# Patient Record
Sex: FEMALE | Race: WHITE | NOT HISPANIC OR LATINO | Employment: OTHER | URBAN - METROPOLITAN AREA
[De-identification: names, ages, dates, MRNs, and addresses within clinical notes are randomized per-mention and may not be internally consistent; named-entity substitution may affect disease eponyms.]

---

## 2018-03-05 ENCOUNTER — HOSPITAL ENCOUNTER (OUTPATIENT)
Dept: RADIOLOGY | Facility: HOSPITAL | Age: 53
Discharge: HOME | End: 2018-03-05
Attending: OBSTETRICS & GYNECOLOGY
Payer: COMMERCIAL

## 2018-03-05 DIAGNOSIS — Z12.39 SCREENING BREAST EXAMINATION: Primary | ICD-10-CM

## 2018-03-05 DIAGNOSIS — Z12.39 SCREENING BREAST EXAMINATION: ICD-10-CM

## 2018-03-05 PROCEDURE — 77067 SCR MAMMO BI INCL CAD: CPT | Mod: TC

## 2019-03-07 ENCOUNTER — OFFICE VISIT (OUTPATIENT)
Dept: ORTHOPEDICS | Facility: CLINIC | Age: 54
End: 2019-03-07
Payer: COMMERCIAL

## 2019-03-07 VITALS
HEIGHT: 62 IN | BODY MASS INDEX: 26.31 KG/M2 | WEIGHT: 143 LBS | SYSTOLIC BLOOD PRESSURE: 118 MMHG | HEART RATE: 85 BPM | DIASTOLIC BLOOD PRESSURE: 80 MMHG

## 2019-03-07 DIAGNOSIS — M75.42 SUBACROMIAL IMPINGEMENT OF LEFT SHOULDER: Primary | ICD-10-CM

## 2019-03-07 PROCEDURE — 3008F PR BODY MASS INDEX (BMI) DOCUMENTED: ICD-10-PCS | Mod: ,,, | Performed by: ORTHOPAEDIC SURGERY

## 2019-03-07 PROCEDURE — 99212 PR OFFICE/OUTPT VISIT, EST, LEVL II, 10-19 MIN: ICD-10-PCS | Mod: ,,, | Performed by: ORTHOPAEDIC SURGERY

## 2019-03-07 PROCEDURE — 3008F BODY MASS INDEX DOCD: CPT | Mod: ,,, | Performed by: ORTHOPAEDIC SURGERY

## 2019-03-07 PROCEDURE — 73030 PR  X-RAY SHOULDER 2+ VW: ICD-10-PCS | Mod: LT,,, | Performed by: ORTHOPAEDIC SURGERY

## 2019-03-07 PROCEDURE — 73030 X-RAY EXAM OF SHOULDER: CPT | Mod: LT,,, | Performed by: ORTHOPAEDIC SURGERY

## 2019-03-07 PROCEDURE — 99212 OFFICE O/P EST SF 10 MIN: CPT | Mod: ,,, | Performed by: ORTHOPAEDIC SURGERY

## 2019-03-07 RX ORDER — ESTRADIOL 0.5 MG/1
TABLET ORAL
Refills: 1 | COMMUNITY
Start: 2019-01-17 | End: 2021-10-29

## 2019-03-07 RX ORDER — METOPROLOL TARTRATE 25 MG/1
TABLET, FILM COATED ORAL
Refills: 6 | COMMUNITY
Start: 2018-12-09 | End: 2021-10-29 | Stop reason: SDUPTHER

## 2019-03-07 RX ORDER — TOLTERODINE 4 MG/1
CAPSULE, EXTENDED RELEASE ORAL
Refills: 3 | COMMUNITY
Start: 2019-02-20 | End: 2020-07-01

## 2019-03-07 RX ORDER — NITROFURANTOIN MACROCRYSTALS 50 MG/1
CAPSULE ORAL
Refills: 2 | COMMUNITY
Start: 2019-02-20 | End: 2020-07-01

## 2019-03-07 NOTE — PROGRESS NOTES
[facility] ORTHOPEDICS    Subjective:     Chief Complaint:   Chief Complaint   Patient presents with    Left Shoulder - Pain     Left shoulder pain x 1 year       History reviewed. No pertinent past medical history.    Past Surgical History:   Procedure Laterality Date    c sections         Current Outpatient Medications   Medication Sig    estradiol (ESTRACE) 0.5 MG tablet TK ONE T UNDER THE TONGUE QHS    levomefolate calcium (DEPLIN ORAL) Take by mouth.    metoprolol tartrate (LOPRESSOR) 25 MG tablet TK 1 T PO BID WF    nitrofurantoin (MACRODANTIN) 50 MG capsule TK 1 C PO QID    tolterodine (DETROL LA) 4 MG 24 hr capsule TK 1 C PO QD     No current facility-administered medications for this visit.        Review of patient's allergies indicates:   Allergen Reactions    Penicillins        Family History   Problem Relation Age of Onset    Breast cancer Paternal Aunt        Social History     Socioeconomic History    Marital status:      Spouse name: Not on file    Number of children: Not on file    Years of education: Not on file    Highest education level: Not on file   Social Needs    Financial resource strain: Not on file    Food insecurity - worry: Not on file    Food insecurity - inability: Not on file    Transportation needs - medical: Not on file    Transportation needs - non-medical: Not on file   Occupational History    Not on file   Tobacco Use    Smoking status: Former Smoker    Smokeless tobacco: Former User   Substance and Sexual Activity    Alcohol use: Not on file    Drug use: Not on file    Sexual activity: Not on file   Other Topics Concern    Not on file   Social History Narrative    Not on file       History of present illness: Patient returns for the left shoulder. She is much better than when I saw her last time. She sleeping at night. She is doing physical therapy.       Review of Systems:    Constitution: Negative for chills, fever, and sweats.  Negative for  unexplained weight loss.    HENT:  Negative for headaches and blurry vision.    Cardiovascular:Negative for chest pain or irregular heart beat. Negative for hypertension.    Respiratory:  Negative for cough and shortness of breath.    Gastrointestinal: Negative for abdominal pain, heartburn, melena, nausea, and vomitting.    Genitourinary:  Negative bladder incontinence and dysuria.    Musculoskeletal:  See HPI for details.     Neurological: Negative for numbness.    Psychiatric/Behavioral: Negative for depression.  The patient is not nervous/anxious.      Endocrine: Negative for polyuria    Hematologic/Lymphatic: Negative for bleeding problem.  Does not bruise/bleed easily.    Skin: Negative for poor would healing and rash    Objective:      Physical Examination:    Vital Signs:    Vitals:    03/07/19 0941   BP: 118/80   Pulse: 85       Body mass index is 26.16 kg/m².    This a well-developed, well nourished patient in no acute distress.  They are alert and oriented and cooperative to examination.        Patient has full range of motion of the left shoulder. Positive impingement. Negative crossover. Her rotator cuff is grossly intact.  Pertinent New Results:    XRAY Report / Interpretation: AP and lateral of the left shoulder demonstrates a type I acromion. No fracture subluxations dislocations or osteoporotic changes      Assessment/Plan:      Impingement syndrome left shoulder improving. Continue strengthening. Follow-up in 6 weeks if she symptomatic      This note was created using Dragon voice recognition software that occasionally misinterpreted phrases or words.

## 2019-03-18 DIAGNOSIS — Z12.39 SCREENING BREAST EXAMINATION: Primary | ICD-10-CM

## 2019-03-19 ENCOUNTER — HOSPITAL ENCOUNTER (OUTPATIENT)
Dept: RADIOLOGY | Facility: HOSPITAL | Age: 54
Discharge: HOME | End: 2019-03-19
Attending: OBSTETRICS & GYNECOLOGY
Payer: COMMERCIAL

## 2019-03-19 DIAGNOSIS — Z12.39 SCREENING BREAST EXAMINATION: ICD-10-CM

## 2019-03-19 DIAGNOSIS — Z12.39 SCREENING BREAST EXAMINATION: Primary | ICD-10-CM

## 2019-03-19 PROCEDURE — 77063 BREAST TOMOSYNTHESIS BI: CPT | Mod: 26,,, | Performed by: RADIOLOGY

## 2019-03-19 PROCEDURE — 77067 SCR MAMMO BI INCL CAD: CPT | Mod: 26,,, | Performed by: RADIOLOGY

## 2019-03-19 PROCEDURE — 77067 SCR MAMMO BI INCL CAD: CPT | Mod: TC

## 2019-03-19 PROCEDURE — 77067 MAMMO DIGITAL SCREENING BILAT WITH TOMOSYNTHESIS_CAD: ICD-10-PCS | Mod: 26,,, | Performed by: RADIOLOGY

## 2019-03-19 PROCEDURE — 77063 MAMMO DIGITAL SCREENING BILAT WITH TOMOSYNTHESIS_CAD: ICD-10-PCS | Mod: 26,,, | Performed by: RADIOLOGY

## 2019-10-14 DIAGNOSIS — R92.1 BREAST CALCIFICATIONS: Primary | ICD-10-CM

## 2019-10-17 ENCOUNTER — HOSPITAL ENCOUNTER (OUTPATIENT)
Dept: RADIOLOGY | Facility: HOSPITAL | Age: 54
Discharge: HOME | End: 2019-10-17
Attending: OBSTETRICS & GYNECOLOGY
Payer: COMMERCIAL

## 2019-10-17 DIAGNOSIS — R92.1 BREAST CALCIFICATIONS: ICD-10-CM

## 2019-10-17 PROCEDURE — 77065 DX MAMMO INCL CAD UNI: CPT | Mod: TC

## 2019-10-17 PROCEDURE — 77065 MAMMO DIGITAL DIAGNOSTIC RIGHT WITH TOMOSYNTHESIS_CAD: ICD-10-PCS | Mod: 26,,, | Performed by: RADIOLOGY

## 2019-10-17 PROCEDURE — 77065 DX MAMMO INCL CAD UNI: CPT | Mod: 26,,, | Performed by: RADIOLOGY

## 2019-10-17 PROCEDURE — 77061 BREAST TOMOSYNTHESIS UNI: CPT | Mod: TC

## 2019-10-17 PROCEDURE — 77061 MAMMO DIGITAL DIAGNOSTIC RIGHT WITH TOMOSYNTHESIS_CAD: ICD-10-PCS | Mod: 26,,, | Performed by: RADIOLOGY

## 2019-10-17 PROCEDURE — 77061 BREAST TOMOSYNTHESIS UNI: CPT | Mod: 26,,, | Performed by: RADIOLOGY

## 2020-07-01 ENCOUNTER — IMMUNIZATION (OUTPATIENT)
Dept: PHARMACY | Facility: CLINIC | Age: 55
End: 2020-07-01
Payer: COMMERCIAL

## 2020-07-01 ENCOUNTER — CLINICAL SUPPORT (OUTPATIENT)
Dept: INTERNAL MEDICINE | Facility: CLINIC | Age: 55
End: 2020-07-01
Payer: COMMERCIAL

## 2020-07-01 ENCOUNTER — OFFICE VISIT (OUTPATIENT)
Dept: FAMILY MEDICINE | Facility: CLINIC | Age: 55
End: 2020-07-01
Payer: COMMERCIAL

## 2020-07-01 VITALS
BODY MASS INDEX: 27.92 KG/M2 | HEIGHT: 62 IN | OXYGEN SATURATION: 98 % | HEART RATE: 61 BPM | WEIGHT: 151.69 LBS | TEMPERATURE: 98 F | SYSTOLIC BLOOD PRESSURE: 122 MMHG | DIASTOLIC BLOOD PRESSURE: 80 MMHG

## 2020-07-01 DIAGNOSIS — Z00.00 WELLNESS EXAMINATION: Primary | ICD-10-CM

## 2020-07-01 DIAGNOSIS — Z00.00 ANNUAL PHYSICAL EXAM: Primary | ICD-10-CM

## 2020-07-01 LAB
25(OH)D3+25(OH)D2 SERPL-MCNC: 50 NG/ML (ref 30–96)
ALBUMIN SERPL BCP-MCNC: 4 G/DL (ref 3.5–5.2)
ALP SERPL-CCNC: 43 U/L (ref 55–135)
ALT SERPL W/O P-5'-P-CCNC: 26 U/L (ref 10–44)
ANION GAP SERPL CALC-SCNC: 7 MMOL/L (ref 8–16)
AST SERPL-CCNC: 25 U/L (ref 10–40)
BILIRUB SERPL-MCNC: 0.4 MG/DL (ref 0.1–1)
BUN SERPL-MCNC: 11 MG/DL (ref 6–20)
CALCIUM SERPL-MCNC: 9.5 MG/DL (ref 8.7–10.5)
CHLORIDE SERPL-SCNC: 102 MMOL/L (ref 95–110)
CHOLEST SERPL-MCNC: 220 MG/DL (ref 120–199)
CHOLEST/HDLC SERPL: 2.3 {RATIO} (ref 2–5)
CO2 SERPL-SCNC: 25 MMOL/L (ref 23–29)
CREAT SERPL-MCNC: 0.7 MG/DL (ref 0.5–1.4)
ERYTHROCYTE [DISTWIDTH] IN BLOOD BY AUTOMATED COUNT: 12.1 % (ref 11.5–14.5)
EST. GFR  (AFRICAN AMERICAN): >60 ML/MIN/1.73 M^2
EST. GFR  (NON AFRICAN AMERICAN): >60 ML/MIN/1.73 M^2
ESTIMATED AVG GLUCOSE: 94 MG/DL (ref 68–131)
GLUCOSE SERPL-MCNC: 93 MG/DL (ref 70–110)
HBA1C MFR BLD HPLC: 4.9 % (ref 4–5.6)
HCT VFR BLD AUTO: 37.7 % (ref 37–48.5)
HDLC SERPL-MCNC: 95 MG/DL (ref 40–75)
HDLC SERPL: 43.2 % (ref 20–50)
HGB BLD-MCNC: 12.4 G/DL (ref 12–16)
LDLC SERPL CALC-MCNC: 90.2 MG/DL (ref 63–159)
MCH RBC QN AUTO: 32 PG (ref 27–31)
MCHC RBC AUTO-ENTMCNC: 32.9 G/DL (ref 32–36)
MCV RBC AUTO: 97 FL (ref 82–98)
NONHDLC SERPL-MCNC: 125 MG/DL
PLATELET # BLD AUTO: 305 K/UL (ref 150–350)
PMV BLD AUTO: 9.9 FL (ref 9.2–12.9)
POTASSIUM SERPL-SCNC: 3.8 MMOL/L (ref 3.5–5.1)
PROT SERPL-MCNC: 7 G/DL (ref 6–8.4)
RBC # BLD AUTO: 3.87 M/UL (ref 4–5.4)
SARS-COV-2 IGG SERPLBLD QL IA.RAPID: NEGATIVE
SODIUM SERPL-SCNC: 134 MMOL/L (ref 136–145)
TRIGL SERPL-MCNC: 174 MG/DL (ref 30–150)
TSH SERPL DL<=0.005 MIU/L-ACNC: 3.06 UIU/ML (ref 0.4–4)
WBC # BLD AUTO: 6 K/UL (ref 3.9–12.7)

## 2020-07-01 PROCEDURE — 80061 LIPID PANEL: CPT

## 2020-07-01 PROCEDURE — 99386 PR PREVENTIVE VISIT,NEW,40-64: ICD-10-PCS | Mod: S$GLB,,, | Performed by: FAMILY MEDICINE

## 2020-07-01 PROCEDURE — 99999 PR PBB SHADOW E&M-EST. PATIENT-LVL III: CPT | Mod: PBBFAC,,, | Performed by: FAMILY MEDICINE

## 2020-07-01 PROCEDURE — 80053 COMPREHEN METABOLIC PANEL: CPT | Mod: PO

## 2020-07-01 PROCEDURE — 99386 PREV VISIT NEW AGE 40-64: CPT | Mod: S$GLB,,, | Performed by: FAMILY MEDICINE

## 2020-07-01 PROCEDURE — 84443 ASSAY THYROID STIM HORMONE: CPT

## 2020-07-01 PROCEDURE — 82306 VITAMIN D 25 HYDROXY: CPT

## 2020-07-01 PROCEDURE — 83036 HEMOGLOBIN GLYCOSYLATED A1C: CPT

## 2020-07-01 PROCEDURE — 85027 COMPLETE CBC AUTOMATED: CPT | Mod: PO

## 2020-07-01 PROCEDURE — 86769 SARS-COV-2 COVID-19 ANTIBODY: CPT

## 2020-07-01 PROCEDURE — 99999 PR PBB SHADOW E&M-EST. PATIENT-LVL III: ICD-10-PCS | Mod: PBBFAC,,, | Performed by: FAMILY MEDICINE

## 2020-07-01 PROCEDURE — 86703 HIV-1/HIV-2 1 RESULT ANTBDY: CPT

## 2020-07-01 PROCEDURE — 86803 HEPATITIS C AB TEST: CPT

## 2020-07-01 RX ORDER — TOBRAMYCIN AND DEXAMETHASONE 3; 1 MG/ML; MG/ML
SUSPENSION/ DROPS OPHTHALMIC
COMMUNITY
Start: 2020-06-22 | End: 2021-10-29

## 2020-07-01 RX ORDER — ALPRAZOLAM 0.5 MG/1
TABLET ORAL
COMMUNITY
Start: 2020-06-19 | End: 2022-11-29 | Stop reason: DRUGHIGH

## 2020-07-01 NOTE — PROGRESS NOTES
2020                                                                                                                                                                                                                                                                                      Kirsten Doshi  [address] [place] [address]                                                                                                                                                                                                                                                                                                RE: Kirsten Doshi                                                        Clinic #:[id number]                                                                                                                                   Dear  Kirsten Doshi,                                                                                                                                           Thank you for allowing me to serve you and perform your Executive Health exam on 2020.   This letter will serve a brief summary of the history, physical findings, and laboratory/studies performed and recommendations at that time.                                                                                         REASON FOR VISIT: Executive Health Preventive Physical Examination    Past Medical History:   Diagnosis Date    Colon polyps     Endometriosis     PVC (premature ventricular contraction)        Past Surgical History:   Procedure Laterality Date    c sections       SECTION         Family History   Problem Relation Age of Onset    Breast cancer Paternal Aunt        Social History     Socioeconomic History    Marital status:      Spouse name: Not on file    Number of children: 2    Years of education: Not on file    Highest education level: Not on file   Occupational History     Occupation: radiologist     Employer: [employer]   Social Needs    Financial resource strain: Not on file    Food insecurity     Worry: Not on file     Inability: Not on file    Transportation needs     Medical: Not on file     Non-medical: Not on file   Tobacco Use    Smoking status: Former Smoker    Smokeless tobacco: Former User   Substance and Sexual Activity    Alcohol use: Yes    Drug use: Never    Sexual activity: Yes   Lifestyle    Physical activity     Days per week: Not on file     Minutes per session: Not on file    Stress: To some extent   Relationships    Social connections     Talks on phone: Not on file     Gets together: Not on file     Attends Hoahaoism service: Not on file     Active member of club or organization: Not on file     Attends meetings of clubs or organizations: Not on file     Relationship status: Not on file   Other Topics Concern    Not on file   Social History Narrative    Not on file       Allergies: Penicillins    Current Outpatient Medications   Medication Sig Dispense Refill    estradiol (ESTRACE) 0.5 MG tablet TK ONE T UNDER THE TONGUE QHS  1    levomefolate calcium (DEPLIN ORAL) Take by mouth.      metoprolol tartrate (LOPRESSOR) 25 MG tablet TK 1 T PO BID WF  6    ALPRAZolam (XANAX) 0.5 MG tablet       nitrofurantoin (MACRODANTIN) 50 MG capsule TK 1 C PO QID  2    tobramycin-dexamethasone 0.3-0.1% (TOBRADEX) 0.3-0.1 % DrpS       tolterodine (DETROL LA) 4 MG 24 hr capsule TK 1 C PO QD  3     No current facility-administered medications for this visit.        REVIEW OF SYSTEMS:  No recent changes in weight, or complaints of fatigue. No recent changes in vision, or hearing. Denies frequent headaches.No recent changes in voice. No new or changing skin lesions. Denies abnormal bruising or bleeding.  Denies chest pain or sensation of skipped beats. No new onset of shortness of breath, or dyspnea on exertion. Denies abdominal discomfort, constipation,  "diarrhea,or blood in stool. Denies difficulty with urination.   No recent joint swelling or muscle discomfort. Denies pain or weakness in extremeties. No recent loss of balance. Denies problems with sleep or depression.        Remainder of the review of systems without pertinent positves at this time.                                                                              PHYSICAL EXAM:   VITAL SIGNS: /80 (BP Location: Left arm, Patient Position: Sitting, BP Method: Large (Manual))   Pulse 61   Temp 98.3 °F (36.8 °C) (Oral)   Ht 5' 2" (1.575 m)   Wt 68.8 kg (151 lb 10.8 oz)   LMP 06/22/2020 (Approximate)   SpO2 98%   BMI 27.74 kg/m²   GENERAL APPEARANCE:  Well nourished and normally developed,  pleasant 54 y.o. female, in good spirits.  SKIN: Without rashes or overt lesions.  HEENT: Head normacephalic. There was no scleral icterus. Mucous membranes were moist. Dentition. Neck is supple, no thyromegally, or carotid bruits.  LUNGS: Clear to auscultation bilaterally. Normal respiratory effort.  HEART: Exam reveals regular rate and rhythm. First and second heart sounds normal. No murmurs, rubs or gallops.   ABDOMEN: Soft, non-tender, non-distended. Exam reveals normal bowl sounds, no masses, no organomegaly and no aortic enlargement.    EXTREMITIES:  Nonedematous, both femoral and pedal pulses are normal. No joint stiffness or tenderness. Full range of motion and strength, upper and lower bilaterally.      ASSESSMENT/RECOMMENDATIONS :  Wellness exam    At this time,  you appear to be in good medical condition.    Continue to work on regular exercise, maintenance of a healthy weight, balanced diet rich in fruits/vegetables and lean protein, and avoidance of unhealthy habits like smoking and excessive alcohol intake.  I look forward to seeing you again next year.    Please contact me should you have any questions or concerns regarding physical findings, or my recommendations.       Sincerely yours,    MMargy " Fina Hutchison M.D.  Department of Family Practice  [facility]

## 2020-07-02 LAB
HCV AB SERPL QL IA: NEGATIVE
HIV 1+2 AB+HIV1 P24 AG SERPL QL IA: NEGATIVE

## 2020-09-24 ENCOUNTER — IMMUNIZATION (OUTPATIENT)
Dept: PHARMACY | Facility: CLINIC | Age: 55
End: 2020-09-24
Payer: COMMERCIAL

## 2020-10-13 ENCOUNTER — HOSPITAL ENCOUNTER (OUTPATIENT)
Dept: RADIOLOGY | Facility: HOSPITAL | Age: 55
Discharge: HOME | End: 2020-10-13
Attending: OBSTETRICS & GYNECOLOGY
Payer: COMMERCIAL

## 2020-10-13 DIAGNOSIS — R92.1 BREAST CALCIFICATION SEEN ON MAMMOGRAM: ICD-10-CM

## 2020-10-13 DIAGNOSIS — N64.89 GALACTOCELE: ICD-10-CM

## 2020-10-13 PROCEDURE — 76642 US BREAST BILATERAL LIMITED: ICD-10-PCS | Mod: 26,,, | Performed by: RADIOLOGY

## 2020-10-13 PROCEDURE — 77066 MAMMO DIGITAL DIAGNOSTIC BILAT WITH TOMO: ICD-10-PCS | Mod: 26,,, | Performed by: RADIOLOGY

## 2020-10-13 PROCEDURE — 77066 DX MAMMO INCL CAD BI: CPT | Mod: 26,,, | Performed by: RADIOLOGY

## 2020-10-13 PROCEDURE — 77062 BREAST TOMOSYNTHESIS BI: CPT | Mod: 26,,, | Performed by: RADIOLOGY

## 2020-10-13 PROCEDURE — 77066 DX MAMMO INCL CAD BI: CPT | Mod: TC

## 2020-10-13 PROCEDURE — 76642 ULTRASOUND BREAST LIMITED: CPT | Mod: 26,,, | Performed by: RADIOLOGY

## 2020-10-13 PROCEDURE — 77062 MAMMO DIGITAL DIAGNOSTIC BILAT WITH TOMO: ICD-10-PCS | Mod: 26,,, | Performed by: RADIOLOGY

## 2020-10-13 PROCEDURE — 76642 ULTRASOUND BREAST LIMITED: CPT | Mod: TC,50

## 2020-10-23 ENCOUNTER — IMMUNIZATION (OUTPATIENT)
Dept: PHARMACY | Facility: CLINIC | Age: 55
End: 2020-10-23
Payer: COMMERCIAL

## 2020-12-17 ENCOUNTER — IMMUNIZATION (OUTPATIENT)
Dept: PRIMARY CARE CLINIC | Facility: CLINIC | Age: 55
End: 2020-12-17
Payer: COMMERCIAL

## 2020-12-17 DIAGNOSIS — Z23 NEED FOR VACCINATION: ICD-10-CM

## 2020-12-17 PROCEDURE — 0001A COVID-19, MRNA, LNP-S, PF, 30 MCG/0.3 ML DOSE VACCINE: ICD-10-PCS | Mod: CV19,S$GLB,, | Performed by: FAMILY MEDICINE

## 2020-12-17 PROCEDURE — 91300 COVID-19, MRNA, LNP-S, PF, 30 MCG/0.3 ML DOSE VACCINE: CPT | Mod: S$GLB,,, | Performed by: FAMILY MEDICINE

## 2020-12-17 PROCEDURE — 91300 COVID-19, MRNA, LNP-S, PF, 30 MCG/0.3 ML DOSE VACCINE: ICD-10-PCS | Mod: S$GLB,,, | Performed by: FAMILY MEDICINE

## 2020-12-17 PROCEDURE — 0001A COVID-19, MRNA, LNP-S, PF, 30 MCG/0.3 ML DOSE VACCINE: CPT | Mod: CV19,S$GLB,, | Performed by: FAMILY MEDICINE

## 2021-01-07 ENCOUNTER — IMMUNIZATION (OUTPATIENT)
Dept: PRIMARY CARE CLINIC | Facility: CLINIC | Age: 56
End: 2021-01-07
Payer: COMMERCIAL

## 2021-01-07 DIAGNOSIS — Z23 NEED FOR VACCINATION: ICD-10-CM

## 2021-01-07 PROCEDURE — 0002A COVID-19, MRNA, LNP-S, PF, 30 MCG/0.3 ML DOSE VACCINE: ICD-10-PCS | Mod: S$GLB,,, | Performed by: FAMILY MEDICINE

## 2021-01-07 PROCEDURE — 0002A COVID-19, MRNA, LNP-S, PF, 30 MCG/0.3 ML DOSE VACCINE: CPT | Mod: S$GLB,,, | Performed by: FAMILY MEDICINE

## 2021-01-07 PROCEDURE — 91300 COVID-19, MRNA, LNP-S, PF, 30 MCG/0.3 ML DOSE VACCINE: CPT | Mod: S$GLB,,, | Performed by: FAMILY MEDICINE

## 2021-01-07 PROCEDURE — 91300 COVID-19, MRNA, LNP-S, PF, 30 MCG/0.3 ML DOSE VACCINE: ICD-10-PCS | Mod: S$GLB,,, | Performed by: FAMILY MEDICINE

## 2021-04-27 ENCOUNTER — HOSPITAL ENCOUNTER (OUTPATIENT)
Dept: RADIOLOGY | Facility: HOSPITAL | Age: 56
Discharge: HOME | End: 2021-04-27
Attending: OBSTETRICS & GYNECOLOGY
Payer: COMMERCIAL

## 2021-04-27 DIAGNOSIS — R92.1 BREAST CALCIFICATION SEEN ON MAMMOGRAM: ICD-10-CM

## 2021-04-27 PROCEDURE — 77062 BREAST TOMOSYNTHESIS BI: CPT | Mod: 26,,, | Performed by: RADIOLOGY

## 2021-04-27 PROCEDURE — 77062 BREAST TOMOSYNTHESIS BI: CPT | Mod: TC

## 2021-04-27 PROCEDURE — 77062 MAMMO DIGITAL DIAGNOSTIC BILAT WITH TOMO: ICD-10-PCS | Mod: 26,,, | Performed by: RADIOLOGY

## 2021-04-27 PROCEDURE — 77066 DX MAMMO INCL CAD BI: CPT | Mod: 26,,, | Performed by: RADIOLOGY

## 2021-04-27 PROCEDURE — 77066 MAMMO DIGITAL DIAGNOSTIC BILAT WITH TOMO: ICD-10-PCS | Mod: 26,,, | Performed by: RADIOLOGY

## 2021-05-24 ENCOUNTER — HOSPITAL ENCOUNTER (OUTPATIENT)
Dept: RADIOLOGY | Facility: HOSPITAL | Age: 56
Discharge: HOME | End: 2021-05-24
Attending: OBSTETRICS & GYNECOLOGY
Payer: COMMERCIAL

## 2021-05-24 DIAGNOSIS — R92.1 BREAST CALCIFICATION SEEN ON MAMMOGRAM: ICD-10-CM

## 2021-05-24 LAB
CREAT SERPL-MCNC: 0.7 MG/DL (ref 0.5–1.4)
SAMPLE: NORMAL

## 2021-05-24 PROCEDURE — 25500020 PHARM REV CODE 255: Mod: PO | Performed by: OBSTETRICS & GYNECOLOGY

## 2021-05-24 PROCEDURE — A9585 GADOBUTROL INJECTION: HCPCS | Mod: PO | Performed by: OBSTETRICS & GYNECOLOGY

## 2021-05-24 PROCEDURE — 77049 MRI BREAST C-+ W/CAD BI: CPT | Mod: TC,PO

## 2021-05-24 RX ORDER — GADOBUTROL 604.72 MG/ML
6.5 INJECTION INTRAVENOUS
Status: COMPLETED | OUTPATIENT
Start: 2021-05-24 | End: 2021-05-24

## 2021-05-24 RX ADMIN — GADOBUTROL 6.5 ML: 604.72 INJECTION INTRAVENOUS at 11:05

## 2021-08-02 ENCOUNTER — PATIENT MESSAGE (OUTPATIENT)
Dept: FAMILY MEDICINE | Facility: CLINIC | Age: 56
End: 2021-08-02

## 2021-08-03 ENCOUNTER — PATIENT MESSAGE (OUTPATIENT)
Dept: FAMILY MEDICINE | Facility: CLINIC | Age: 56
End: 2021-08-03

## 2021-10-01 ENCOUNTER — IMMUNIZATION (OUTPATIENT)
Dept: FAMILY MEDICINE | Facility: CLINIC | Age: 56
End: 2021-10-01
Payer: COMMERCIAL

## 2021-10-01 DIAGNOSIS — Z23 NEED FOR VACCINATION: Primary | ICD-10-CM

## 2021-10-01 PROCEDURE — 0003A COVID-19, MRNA, LNP-S, PF, 30 MCG/0.3 ML DOSE VACCINE: CPT | Mod: PBBFAC | Performed by: INTERNAL MEDICINE

## 2021-10-01 PROCEDURE — 91300 COVID-19, MRNA, LNP-S, PF, 30 MCG/0.3 ML DOSE VACCINE: CPT | Mod: PBBFAC | Performed by: INTERNAL MEDICINE

## 2021-10-19 ENCOUNTER — IMMUNIZATION (OUTPATIENT)
Dept: PHARMACY | Facility: CLINIC | Age: 56
End: 2021-10-19
Payer: COMMERCIAL

## 2021-10-29 ENCOUNTER — OFFICE VISIT (OUTPATIENT)
Dept: FAMILY MEDICINE | Facility: CLINIC | Age: 56
End: 2021-10-29
Payer: COMMERCIAL

## 2021-10-29 VITALS
DIASTOLIC BLOOD PRESSURE: 84 MMHG | OXYGEN SATURATION: 98 % | SYSTOLIC BLOOD PRESSURE: 132 MMHG | WEIGHT: 154.75 LBS | HEART RATE: 96 BPM | BODY MASS INDEX: 28.31 KG/M2

## 2021-10-29 DIAGNOSIS — Z00.00 WELLNESS EXAMINATION: Primary | ICD-10-CM

## 2021-10-29 DIAGNOSIS — M25.542 JOINT PAIN IN BOTH HANDS: ICD-10-CM

## 2021-10-29 DIAGNOSIS — M25.541 JOINT PAIN IN BOTH HANDS: ICD-10-CM

## 2021-10-29 DIAGNOSIS — R20.9 COLD FINGER WITHOUT PERIPHERAL VASCULAR DISEASE: ICD-10-CM

## 2021-10-29 PROCEDURE — 3008F PR BODY MASS INDEX (BMI) DOCUMENTED: ICD-10-PCS | Mod: CPTII,S$GLB,, | Performed by: FAMILY MEDICINE

## 2021-10-29 PROCEDURE — 99999 PR PBB SHADOW E&M-EST. PATIENT-LVL III: ICD-10-PCS | Mod: PBBFAC,,, | Performed by: FAMILY MEDICINE

## 2021-10-29 PROCEDURE — 1160F PR REVIEW ALL MEDS BY PRESCRIBER/CLIN PHARMACIST DOCUMENTED: ICD-10-PCS | Mod: CPTII,S$GLB,, | Performed by: FAMILY MEDICINE

## 2021-10-29 PROCEDURE — 1160F RVW MEDS BY RX/DR IN RCRD: CPT | Mod: CPTII,S$GLB,, | Performed by: FAMILY MEDICINE

## 2021-10-29 PROCEDURE — 99214 OFFICE O/P EST MOD 30 MIN: CPT | Mod: S$GLB,,, | Performed by: FAMILY MEDICINE

## 2021-10-29 PROCEDURE — 3079F DIAST BP 80-89 MM HG: CPT | Mod: CPTII,S$GLB,, | Performed by: FAMILY MEDICINE

## 2021-10-29 PROCEDURE — 3008F BODY MASS INDEX DOCD: CPT | Mod: CPTII,S$GLB,, | Performed by: FAMILY MEDICINE

## 2021-10-29 PROCEDURE — 99214 PR OFFICE/OUTPT VISIT, EST, LEVL IV, 30-39 MIN: ICD-10-PCS | Mod: S$GLB,,, | Performed by: FAMILY MEDICINE

## 2021-10-29 PROCEDURE — 99999 PR PBB SHADOW E&M-EST. PATIENT-LVL III: CPT | Mod: PBBFAC,,, | Performed by: FAMILY MEDICINE

## 2021-10-29 PROCEDURE — 1159F PR MEDICATION LIST DOCUMENTED IN MEDICAL RECORD: ICD-10-PCS | Mod: CPTII,S$GLB,, | Performed by: FAMILY MEDICINE

## 2021-10-29 PROCEDURE — 3075F SYST BP GE 130 - 139MM HG: CPT | Mod: CPTII,S$GLB,, | Performed by: FAMILY MEDICINE

## 2021-10-29 PROCEDURE — 1159F MED LIST DOCD IN RCRD: CPT | Mod: CPTII,S$GLB,, | Performed by: FAMILY MEDICINE

## 2021-10-29 PROCEDURE — 3079F PR MOST RECENT DIASTOLIC BLOOD PRESSURE 80-89 MM HG: ICD-10-PCS | Mod: CPTII,S$GLB,, | Performed by: FAMILY MEDICINE

## 2021-10-29 PROCEDURE — 3075F PR MOST RECENT SYSTOLIC BLOOD PRESS GE 130-139MM HG: ICD-10-PCS | Mod: CPTII,S$GLB,, | Performed by: FAMILY MEDICINE

## 2021-10-29 RX ORDER — TESTOSTERONE CYPIONATE 200 MG/ML
INJECTION, SOLUTION INTRAMUSCULAR
COMMUNITY
Start: 2021-10-14 | End: 2023-03-16

## 2021-10-29 RX ORDER — CICLOPIROX 80 MG/ML
SOLUTION TOPICAL
COMMUNITY
Start: 2021-10-12 | End: 2022-12-13

## 2021-10-29 RX ORDER — ESTRADIOL CYPIONATE 5 MG/ML
5 INJECTION INTRAMUSCULAR
COMMUNITY
Start: 2021-09-28 | End: 2021-12-08 | Stop reason: SDUPTHER

## 2021-10-29 RX ORDER — METOPROLOL TARTRATE 25 MG/1
12.5 TABLET, FILM COATED ORAL DAILY
Qty: 45 TABLET | Refills: 6
Start: 2021-10-29 | End: 2023-11-15

## 2021-11-08 ENCOUNTER — TELEPHONE (OUTPATIENT)
Dept: RADIOLOGY | Facility: HOSPITAL | Age: 56
End: 2021-11-08
Payer: COMMERCIAL

## 2021-11-09 ENCOUNTER — TELEPHONE (OUTPATIENT)
Dept: RADIOLOGY | Facility: HOSPITAL | Age: 56
End: 2021-11-09
Payer: COMMERCIAL

## 2021-11-16 ENCOUNTER — LAB VISIT (OUTPATIENT)
Dept: LAB | Facility: HOSPITAL | Age: 56
End: 2021-11-16
Attending: FAMILY MEDICINE
Payer: COMMERCIAL

## 2021-11-16 DIAGNOSIS — M25.541 JOINT PAIN IN BOTH HANDS: ICD-10-CM

## 2021-11-16 DIAGNOSIS — M25.542 JOINT PAIN IN BOTH HANDS: ICD-10-CM

## 2021-11-16 DIAGNOSIS — Z00.00 WELLNESS EXAMINATION: ICD-10-CM

## 2021-11-16 DIAGNOSIS — R20.9 COLD FINGER WITHOUT PERIPHERAL VASCULAR DISEASE: ICD-10-CM

## 2021-11-16 LAB
ALBUMIN SERPL BCP-MCNC: 3.9 G/DL (ref 3.5–5.2)
ALP SERPL-CCNC: 55 U/L (ref 55–135)
ALT SERPL W/O P-5'-P-CCNC: 27 U/L (ref 10–44)
ANION GAP SERPL CALC-SCNC: 10 MMOL/L (ref 8–16)
AST SERPL-CCNC: 24 U/L (ref 10–40)
BASOPHILS # BLD AUTO: 0.04 K/UL (ref 0–0.2)
BASOPHILS NFR BLD: 0.7 % (ref 0–1.9)
BILIRUB SERPL-MCNC: 0.2 MG/DL (ref 0.1–1)
BUN SERPL-MCNC: 13 MG/DL (ref 6–20)
CALCIUM SERPL-MCNC: 9.5 MG/DL (ref 8.7–10.5)
CHLORIDE SERPL-SCNC: 104 MMOL/L (ref 95–110)
CHOLEST SERPL-MCNC: 228 MG/DL (ref 120–199)
CHOLEST/HDLC SERPL: 2.6 {RATIO} (ref 2–5)
CO2 SERPL-SCNC: 26 MMOL/L (ref 23–29)
CREAT SERPL-MCNC: 0.6 MG/DL (ref 0.5–1.4)
CRP SERPL-MCNC: 1.3 MG/L (ref 0–8.2)
DIFFERENTIAL METHOD: ABNORMAL
EOSINOPHIL # BLD AUTO: 0.1 K/UL (ref 0–0.5)
EOSINOPHIL NFR BLD: 2 % (ref 0–8)
ERYTHROCYTE [DISTWIDTH] IN BLOOD BY AUTOMATED COUNT: 12.5 % (ref 11.5–14.5)
ERYTHROCYTE [SEDIMENTATION RATE] IN BLOOD BY WESTERGREN METHOD: 10 MM/HR (ref 0–20)
EST. GFR  (AFRICAN AMERICAN): >60 ML/MIN/1.73 M^2
EST. GFR  (NON AFRICAN AMERICAN): >60 ML/MIN/1.73 M^2
ESTIMATED AVG GLUCOSE: 100 MG/DL (ref 68–131)
GLUCOSE SERPL-MCNC: 93 MG/DL (ref 70–110)
HBA1C MFR BLD: 5.1 % (ref 4–5.6)
HCT VFR BLD AUTO: 37 % (ref 37–48.5)
HDLC SERPL-MCNC: 87 MG/DL (ref 40–75)
HDLC SERPL: 38.2 % (ref 20–50)
HGB BLD-MCNC: 12.5 G/DL (ref 12–16)
IMM GRANULOCYTES # BLD AUTO: 0.02 K/UL (ref 0–0.04)
IMM GRANULOCYTES NFR BLD AUTO: 0.3 % (ref 0–0.5)
LDLC SERPL CALC-MCNC: 117.6 MG/DL (ref 63–159)
LYMPHOCYTES # BLD AUTO: 1.6 K/UL (ref 1–4.8)
LYMPHOCYTES NFR BLD: 26.7 % (ref 18–48)
MCH RBC QN AUTO: 33.2 PG (ref 27–31)
MCHC RBC AUTO-ENTMCNC: 33.8 G/DL (ref 32–36)
MCV RBC AUTO: 98 FL (ref 82–98)
MONOCYTES # BLD AUTO: 0.4 K/UL (ref 0.3–1)
MONOCYTES NFR BLD: 6.3 % (ref 4–15)
NEUTROPHILS # BLD AUTO: 3.8 K/UL (ref 1.8–7.7)
NEUTROPHILS NFR BLD: 64 % (ref 38–73)
NONHDLC SERPL-MCNC: 141 MG/DL
NRBC BLD-RTO: 0 /100 WBC
PLATELET # BLD AUTO: 300 K/UL (ref 150–450)
PMV BLD AUTO: 10.3 FL (ref 9.2–12.9)
POTASSIUM SERPL-SCNC: 5 MMOL/L (ref 3.5–5.1)
PROT SERPL-MCNC: 6.8 G/DL (ref 6–8.4)
RBC # BLD AUTO: 3.76 M/UL (ref 4–5.4)
RHEUMATOID FACT SERPL-ACNC: 15 IU/ML (ref 0–15)
SODIUM SERPL-SCNC: 140 MMOL/L (ref 136–145)
TRIGL SERPL-MCNC: 117 MG/DL (ref 30–150)
TSH SERPL DL<=0.005 MIU/L-ACNC: 2.66 UIU/ML (ref 0.4–4)
WBC # BLD AUTO: 5.91 K/UL (ref 3.9–12.7)

## 2021-11-16 PROCEDURE — 80061 LIPID PANEL: CPT | Performed by: FAMILY MEDICINE

## 2021-11-16 PROCEDURE — 85025 COMPLETE CBC W/AUTO DIFF WBC: CPT | Performed by: FAMILY MEDICINE

## 2021-11-16 PROCEDURE — 84443 ASSAY THYROID STIM HORMONE: CPT | Performed by: FAMILY MEDICINE

## 2021-11-16 PROCEDURE — 80053 COMPREHEN METABOLIC PANEL: CPT | Performed by: FAMILY MEDICINE

## 2021-11-16 PROCEDURE — 86431 RHEUMATOID FACTOR QUANT: CPT | Performed by: FAMILY MEDICINE

## 2021-11-16 PROCEDURE — 86140 C-REACTIVE PROTEIN: CPT | Performed by: FAMILY MEDICINE

## 2021-11-16 PROCEDURE — 83036 HEMOGLOBIN GLYCOSYLATED A1C: CPT | Performed by: FAMILY MEDICINE

## 2021-11-16 PROCEDURE — 36415 COLL VENOUS BLD VENIPUNCTURE: CPT | Mod: PO | Performed by: FAMILY MEDICINE

## 2021-11-16 PROCEDURE — 86038 ANTINUCLEAR ANTIBODIES: CPT | Performed by: FAMILY MEDICINE

## 2021-11-16 PROCEDURE — 85651 RBC SED RATE NONAUTOMATED: CPT | Mod: PO | Performed by: FAMILY MEDICINE

## 2021-11-17 LAB — ANA SER QL IF: NORMAL

## 2021-11-24 ENCOUNTER — PATIENT MESSAGE (OUTPATIENT)
Dept: FAMILY MEDICINE | Facility: CLINIC | Age: 56
End: 2021-11-24
Payer: COMMERCIAL

## 2021-11-30 ENCOUNTER — HOSPITAL ENCOUNTER (OUTPATIENT)
Dept: RADIOLOGY | Facility: HOSPITAL | Age: 56
Discharge: HOME | End: 2021-11-30
Attending: FAMILY MEDICINE
Payer: COMMERCIAL

## 2021-11-30 DIAGNOSIS — R92.8 ABNORMAL MAMMOGRAM OF LEFT BREAST: ICD-10-CM

## 2021-11-30 PROCEDURE — 77065 MAMMO DIGITAL DIAGNOSTIC LEFT WITH TOMO: ICD-10-PCS | Mod: 26,LT,, | Performed by: RADIOLOGY

## 2021-11-30 PROCEDURE — G0279 MAMMO DIGITAL DIAGNOSTIC LEFT WITH TOMO: ICD-10-PCS | Mod: 26,LT,, | Performed by: RADIOLOGY

## 2021-11-30 PROCEDURE — 76642 ULTRASOUND BREAST LIMITED: CPT | Mod: TC,LT

## 2021-11-30 PROCEDURE — 77061 BREAST TOMOSYNTHESIS UNI: CPT | Mod: TC,LT

## 2021-11-30 PROCEDURE — G0279 TOMOSYNTHESIS, MAMMO: HCPCS | Mod: 26,LT,, | Performed by: RADIOLOGY

## 2021-11-30 PROCEDURE — 77065 DX MAMMO INCL CAD UNI: CPT | Mod: 26,LT,, | Performed by: RADIOLOGY

## 2021-12-07 ENCOUNTER — PATIENT MESSAGE (OUTPATIENT)
Dept: FAMILY MEDICINE | Facility: CLINIC | Age: 56
End: 2021-12-07
Payer: COMMERCIAL

## 2021-12-07 ENCOUNTER — PATIENT OUTREACH (OUTPATIENT)
Dept: ADMINISTRATIVE | Facility: OTHER | Age: 56
End: 2021-12-07
Payer: COMMERCIAL

## 2021-12-07 DIAGNOSIS — M25.50 ARTHRALGIA, UNSPECIFIED JOINT: Primary | ICD-10-CM

## 2021-12-08 ENCOUNTER — OFFICE VISIT (OUTPATIENT)
Dept: OBSTETRICS AND GYNECOLOGY | Facility: CLINIC | Age: 56
End: 2021-12-08
Payer: COMMERCIAL

## 2021-12-08 VITALS
BODY MASS INDEX: 28.93 KG/M2 | SYSTOLIC BLOOD PRESSURE: 116 MMHG | WEIGHT: 157.19 LBS | DIASTOLIC BLOOD PRESSURE: 60 MMHG | HEIGHT: 62 IN

## 2021-12-08 DIAGNOSIS — N76.0 BACTERIAL VAGINOSIS: ICD-10-CM

## 2021-12-08 DIAGNOSIS — Z79.890 HORMONE REPLACEMENT THERAPY (HRT): ICD-10-CM

## 2021-12-08 DIAGNOSIS — Z78.0 MENOPAUSE: ICD-10-CM

## 2021-12-08 DIAGNOSIS — F52.0 HYPOACTIVE SEXUAL DESIRE DISORDER: ICD-10-CM

## 2021-12-08 DIAGNOSIS — B96.89 BACTERIAL VAGINOSIS: ICD-10-CM

## 2021-12-08 DIAGNOSIS — N95.0 POSTMENOPAUSAL BLEEDING: Primary | ICD-10-CM

## 2021-12-08 PROCEDURE — 88305 TISSUE EXAM BY PATHOLOGIST: ICD-10-PCS | Mod: 26,,, | Performed by: PATHOLOGY

## 2021-12-08 PROCEDURE — 88305 TISSUE EXAM BY PATHOLOGIST: CPT | Performed by: PATHOLOGY

## 2021-12-08 PROCEDURE — 99999 PR PBB SHADOW E&M-EST. PATIENT-LVL III: CPT | Mod: PBBFAC,,, | Performed by: OBSTETRICS & GYNECOLOGY

## 2021-12-08 PROCEDURE — 88305 TISSUE EXAM BY PATHOLOGIST: CPT | Mod: 26,,, | Performed by: PATHOLOGY

## 2021-12-08 PROCEDURE — 58100 PR BIOPSY OF UTERUS LINING: ICD-10-PCS | Mod: S$GLB,,, | Performed by: OBSTETRICS & GYNECOLOGY

## 2021-12-08 PROCEDURE — 58100 BIOPSY OF UTERUS LINING: CPT | Mod: S$GLB,,, | Performed by: OBSTETRICS & GYNECOLOGY

## 2021-12-08 PROCEDURE — 99203 OFFICE O/P NEW LOW 30 MIN: CPT | Mod: 25,S$GLB,, | Performed by: OBSTETRICS & GYNECOLOGY

## 2021-12-08 PROCEDURE — 99203 PR OFFICE/OUTPT VISIT, NEW, LEVL III, 30-44 MIN: ICD-10-PCS | Mod: 25,S$GLB,, | Performed by: OBSTETRICS & GYNECOLOGY

## 2021-12-08 PROCEDURE — 99999 PR PBB SHADOW E&M-EST. PATIENT-LVL III: ICD-10-PCS | Mod: PBBFAC,,, | Performed by: OBSTETRICS & GYNECOLOGY

## 2021-12-08 RX ORDER — ESTRADIOL CYPIONATE 5 MG/ML
5 INJECTION INTRAMUSCULAR
Qty: 5 ML | Refills: 4 | Status: SHIPPED | OUTPATIENT
Start: 2021-12-08 | End: 2022-09-07

## 2021-12-08 RX ORDER — TINIDAZOLE 500 MG/1
2 TABLET ORAL DAILY
Qty: 8 TABLET | Refills: 1 | Status: SHIPPED | OUTPATIENT
Start: 2021-12-08 | End: 2022-09-07

## 2021-12-08 RX ORDER — PROGESTERONE 200 MG/1
200 CAPSULE ORAL NIGHTLY
Qty: 30 CAPSULE | Refills: 11 | Status: SHIPPED | OUTPATIENT
Start: 2021-12-08 | End: 2022-12-13 | Stop reason: SDUPTHER

## 2021-12-16 LAB
FINAL PATHOLOGIC DIAGNOSIS: NORMAL
Lab: NORMAL

## 2022-01-20 ENCOUNTER — PATIENT MESSAGE (OUTPATIENT)
Dept: OBSTETRICS AND GYNECOLOGY | Facility: CLINIC | Age: 57
End: 2022-01-20
Payer: COMMERCIAL

## 2022-01-21 RX ORDER — ESTRADIOL 0.1 MG/D
1 PATCH TRANSDERMAL
Qty: 4 PATCH | Refills: 11 | Status: SHIPPED | OUTPATIENT
Start: 2022-01-21 | End: 2022-02-08

## 2022-02-08 ENCOUNTER — PATIENT MESSAGE (OUTPATIENT)
Dept: OBSTETRICS AND GYNECOLOGY | Facility: CLINIC | Age: 57
End: 2022-02-08
Payer: COMMERCIAL

## 2022-02-08 RX ORDER — ESTRADIOL 2 MG/1
2 TABLET ORAL DAILY
Qty: 30 TABLET | Refills: 11 | Status: SHIPPED | OUTPATIENT
Start: 2022-02-08 | End: 2022-12-13 | Stop reason: SDUPTHER

## 2022-04-05 ENCOUNTER — IMMUNIZATION (OUTPATIENT)
Dept: FAMILY MEDICINE | Facility: CLINIC | Age: 57
End: 2022-04-05
Payer: COMMERCIAL

## 2022-04-05 DIAGNOSIS — Z23 NEED FOR VACCINATION: Primary | ICD-10-CM

## 2022-04-05 PROCEDURE — 91305 COVID-19, MRNA, LNP-S, PF, 30 MCG/0.3 ML DOSE VACCINE (PFIZER): CPT | Mod: PBBFAC,PO

## 2022-05-23 ENCOUNTER — PATIENT MESSAGE (OUTPATIENT)
Dept: OBSTETRICS AND GYNECOLOGY | Facility: CLINIC | Age: 57
End: 2022-05-23
Payer: COMMERCIAL

## 2022-05-23 DIAGNOSIS — R92.8 ABNORMAL MAMMOGRAM OF LEFT BREAST: Primary | ICD-10-CM

## 2022-06-03 DIAGNOSIS — R92.8 ABNORMAL MAMMOGRAM: Primary | ICD-10-CM

## 2022-06-21 ENCOUNTER — HOSPITAL ENCOUNTER (OUTPATIENT)
Dept: RADIOLOGY | Facility: HOSPITAL | Age: 57
Discharge: HOME | End: 2022-06-21
Attending: OBSTETRICS & GYNECOLOGY
Payer: COMMERCIAL

## 2022-06-21 DIAGNOSIS — R92.8 ABNORMAL MAMMOGRAM: ICD-10-CM

## 2022-06-21 DIAGNOSIS — R92.8 ABNORMAL MAMMOGRAM OF LEFT BREAST: ICD-10-CM

## 2022-06-21 PROCEDURE — 77066 DX MAMMO INCL CAD BI: CPT | Mod: 26,,, | Performed by: RADIOLOGY

## 2022-06-21 PROCEDURE — 77062 BREAST TOMOSYNTHESIS BI: CPT | Mod: 26,,, | Performed by: RADIOLOGY

## 2022-06-21 PROCEDURE — 77062 BREAST TOMOSYNTHESIS BI: CPT | Mod: TC

## 2022-06-21 PROCEDURE — 77062 MAMMO DIGITAL DIAGNOSTIC BILAT WITH TOMO: ICD-10-PCS | Mod: 26,,, | Performed by: RADIOLOGY

## 2022-06-21 PROCEDURE — 77066 MAMMO DIGITAL DIAGNOSTIC BILAT WITH TOMO: ICD-10-PCS | Mod: 26,,, | Performed by: RADIOLOGY

## 2022-08-22 ENCOUNTER — PATIENT OUTREACH (OUTPATIENT)
Dept: ADMINISTRATIVE | Facility: HOSPITAL | Age: 57
End: 2022-08-22
Payer: COMMERCIAL

## 2022-08-22 ENCOUNTER — PATIENT MESSAGE (OUTPATIENT)
Dept: ADMINISTRATIVE | Facility: HOSPITAL | Age: 57
End: 2022-08-22
Payer: COMMERCIAL

## 2022-08-26 ENCOUNTER — PATIENT MESSAGE (OUTPATIENT)
Dept: FAMILY MEDICINE | Facility: CLINIC | Age: 57
End: 2022-08-26
Payer: COMMERCIAL

## 2022-08-30 ENCOUNTER — PATIENT MESSAGE (OUTPATIENT)
Dept: FAMILY MEDICINE | Facility: CLINIC | Age: 57
End: 2022-08-30
Payer: COMMERCIAL

## 2022-09-07 ENCOUNTER — TELEPHONE (OUTPATIENT)
Dept: FAMILY MEDICINE | Facility: CLINIC | Age: 57
End: 2022-09-07

## 2022-09-07 ENCOUNTER — OFFICE VISIT (OUTPATIENT)
Dept: FAMILY MEDICINE | Facility: CLINIC | Age: 57
End: 2022-09-07
Payer: COMMERCIAL

## 2022-09-07 VITALS
BODY MASS INDEX: 29.44 KG/M2 | OXYGEN SATURATION: 97 % | WEIGHT: 160.94 LBS | SYSTOLIC BLOOD PRESSURE: 122 MMHG | DIASTOLIC BLOOD PRESSURE: 72 MMHG | HEART RATE: 86 BPM

## 2022-09-07 DIAGNOSIS — M25.542 ARTHRALGIA OF BOTH HANDS: ICD-10-CM

## 2022-09-07 DIAGNOSIS — M25.541 ARTHRALGIA OF BOTH HANDS: ICD-10-CM

## 2022-09-07 DIAGNOSIS — B35.1 TOENAIL FUNGUS: ICD-10-CM

## 2022-09-07 DIAGNOSIS — Z00.00 WELLNESS EXAMINATION: Primary | ICD-10-CM

## 2022-09-07 DIAGNOSIS — Z23 FLU VACCINE NEED: Primary | ICD-10-CM

## 2022-09-07 DIAGNOSIS — Z23 NEED FOR VACCINATION: ICD-10-CM

## 2022-09-07 PROCEDURE — 1159F MED LIST DOCD IN RCRD: CPT | Mod: CPTII,S$GLB,, | Performed by: FAMILY MEDICINE

## 2022-09-07 PROCEDURE — 3078F PR MOST RECENT DIASTOLIC BLOOD PRESSURE < 80 MM HG: ICD-10-PCS | Mod: CPTII,S$GLB,, | Performed by: FAMILY MEDICINE

## 2022-09-07 PROCEDURE — 90471 TDAP VACCINE GREATER THAN OR EQUAL TO 7YO IM: ICD-10-PCS | Mod: S$GLB,,, | Performed by: FAMILY MEDICINE

## 2022-09-07 PROCEDURE — 90472 FLU VACCINE (QUAD) GREATER THAN OR EQUAL TO 3YO PRESERVATIVE FREE IM: ICD-10-PCS | Mod: S$GLB,,, | Performed by: FAMILY MEDICINE

## 2022-09-07 PROCEDURE — 99999 PR PBB SHADOW E&M-EST. PATIENT-LVL III: CPT | Mod: PBBFAC,,, | Performed by: FAMILY MEDICINE

## 2022-09-07 PROCEDURE — 90715 TDAP VACCINE 7 YRS/> IM: CPT | Mod: S$GLB,,, | Performed by: FAMILY MEDICINE

## 2022-09-07 PROCEDURE — 90472 IMMUNIZATION ADMIN EACH ADD: CPT | Mod: S$GLB,,, | Performed by: FAMILY MEDICINE

## 2022-09-07 PROCEDURE — 99396 PREV VISIT EST AGE 40-64: CPT | Mod: 25,S$GLB,, | Performed by: FAMILY MEDICINE

## 2022-09-07 PROCEDURE — 90686 IIV4 VACC NO PRSV 0.5 ML IM: CPT | Mod: S$GLB,,, | Performed by: FAMILY MEDICINE

## 2022-09-07 PROCEDURE — 1160F PR REVIEW ALL MEDS BY PRESCRIBER/CLIN PHARMACIST DOCUMENTED: ICD-10-PCS | Mod: CPTII,S$GLB,, | Performed by: FAMILY MEDICINE

## 2022-09-07 PROCEDURE — 3074F SYST BP LT 130 MM HG: CPT | Mod: CPTII,S$GLB,, | Performed by: FAMILY MEDICINE

## 2022-09-07 PROCEDURE — 90471 IMMUNIZATION ADMIN: CPT | Mod: S$GLB,,, | Performed by: FAMILY MEDICINE

## 2022-09-07 PROCEDURE — 3078F DIAST BP <80 MM HG: CPT | Mod: CPTII,S$GLB,, | Performed by: FAMILY MEDICINE

## 2022-09-07 PROCEDURE — 90686 FLU VACCINE (QUAD) GREATER THAN OR EQUAL TO 3YO PRESERVATIVE FREE IM: ICD-10-PCS | Mod: S$GLB,,, | Performed by: FAMILY MEDICINE

## 2022-09-07 PROCEDURE — 1160F RVW MEDS BY RX/DR IN RCRD: CPT | Mod: CPTII,S$GLB,, | Performed by: FAMILY MEDICINE

## 2022-09-07 PROCEDURE — 3074F PR MOST RECENT SYSTOLIC BLOOD PRESSURE < 130 MM HG: ICD-10-PCS | Mod: CPTII,S$GLB,, | Performed by: FAMILY MEDICINE

## 2022-09-07 PROCEDURE — 99999 PR PBB SHADOW E&M-EST. PATIENT-LVL III: ICD-10-PCS | Mod: PBBFAC,,, | Performed by: FAMILY MEDICINE

## 2022-09-07 PROCEDURE — 99396 PR PREVENTIVE VISIT,EST,40-64: ICD-10-PCS | Mod: 25,S$GLB,, | Performed by: FAMILY MEDICINE

## 2022-09-07 PROCEDURE — 1159F PR MEDICATION LIST DOCUMENTED IN MEDICAL RECORD: ICD-10-PCS | Mod: CPTII,S$GLB,, | Performed by: FAMILY MEDICINE

## 2022-09-07 PROCEDURE — 90715 TDAP VACCINE GREATER THAN OR EQUAL TO 7YO IM: ICD-10-PCS | Mod: S$GLB,,, | Performed by: FAMILY MEDICINE

## 2022-09-07 RX ORDER — TERBINAFINE HYDROCHLORIDE 250 MG/1
250 TABLET ORAL DAILY
Qty: 90 TABLET | Refills: 0 | Status: SHIPPED | OUTPATIENT
Start: 2022-09-07 | End: 2022-10-07

## 2022-09-07 RX ORDER — TINIDAZOLE 500 MG/1
2 TABLET ORAL DAILY
Qty: 8 TABLET | Refills: 1 | Status: CANCELLED | OUTPATIENT
Start: 2022-09-07

## 2022-09-07 NOTE — PROGRESS NOTES
Subjective:       Patient ID: Kirsten Doshi is a 56 y.o. female.    Chief Complaint: Nail Problem (Requesting tetanus and flu vaccine)    Pt is known to me.  The pt presents for annual wellness exam.  The pt is doing well.  She reports abnormal toenails.  Review of Systems   Constitutional:  Negative for activity change, appetite change, fatigue and unexpected weight change.   Eyes:  Negative for visual disturbance.   Respiratory:  Negative for cough, chest tightness and shortness of breath.    Cardiovascular:  Negative for chest pain, palpitations and leg swelling.   Gastrointestinal:  Negative for abdominal pain, constipation, diarrhea, nausea and vomiting.   Endocrine: Negative for cold intolerance, heat intolerance and polyuria.   Genitourinary:  Negative for decreased urine volume and dysuria.   Musculoskeletal:  Positive for arthralgias and joint swelling. Negative for back pain.   Skin:  Negative for rash.   Neurological:  Negative for numbness and headaches.     Objective:      Physical Exam  Constitutional:       Appearance: She is well-developed.   HENT:      Head: Normocephalic.   Eyes:      Conjunctiva/sclera: Conjunctivae normal.      Pupils: Pupils are equal, round, and reactive to light.   Neck:      Thyroid: No thyromegaly.   Cardiovascular:      Rate and Rhythm: Normal rate and regular rhythm.      Heart sounds: Normal heart sounds.   Pulmonary:      Effort: Pulmonary effort is normal.      Breath sounds: Normal breath sounds.   Abdominal:      General: Bowel sounds are normal.      Palpations: Abdomen is soft.      Tenderness: There is no abdominal tenderness.   Musculoskeletal:         General: No tenderness or deformity. Normal range of motion.      Cervical back: Normal range of motion and neck supple.   Lymphadenopathy:      Cervical: No cervical adenopathy.   Skin:     General: Skin is warm and dry.      Comments: +toena   Neurological:      Mental Status: She is alert and oriented to  person, place, and time.      Cranial Nerves: No cranial nerve deficit.      Motor: No abnormal muscle tone.      Coordination: Coordination normal.      Deep Tendon Reflexes: Reflexes normal.   Psychiatric:         Behavior: Behavior normal.       Assessment:       1. Wellness examination    2. Toenail fungus    3. Arthralgia of both hands    4. Need for vaccination          Plan:       Kirsten was seen today for nail problem.    Diagnoses and all orders for this visit:    Wellness examination  -     CBC Auto Differential; Future  -     Comprehensive Metabolic Panel; Future  -     Hemoglobin A1C; Future  -     Lipid Panel; Future  -     TSH; Future    Toenail fungus  -     terbinafine HCL (LAMISIL) 250 mg tablet; Take 1 tablet (250 mg total) by mouth once daily.    Arthralgia of both hands  -     Ambulatory referral/consult to Rheumatology; Future    Need for vaccination  -     (In Office Administered) Tdap Vaccine    Other orders  The following orders have not been finalized:  -     Cancel: tinidazole (TINDAMAX) 500 MG tablet    During this visit, I reviewed the pt's history, medications, allergies, and problem list.

## 2022-09-28 ENCOUNTER — TELEPHONE (OUTPATIENT)
Dept: RHEUMATOLOGY | Facility: CLINIC | Age: 57
End: 2022-09-28
Payer: COMMERCIAL

## 2022-09-28 NOTE — TELEPHONE ENCOUNTER
----- Message from Merna Frazier LPN sent at 9/20/2022  3:28 PM CDT -----  Contact: self    ----- Message -----  From: Toma Sue  Sent: 9/20/2022   3:01 PM CDT  To: Faby Soto Staff, Rei METZ Staff    Type: Sooner Appointment Request        Caller is requesting a sooner appointment. Caller declined first available appointment listed below. Caller will not accept being placed on the waitlist and is requesting a message be sent to doctor.        Name of Caller: Patient   When is the first available appointment? 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 Plz call after 10 am if possible she'll be up by then. Thanks   Additional Information: Pt states she has a ref in and needs to be seen soon. Plz call out to her to get scheduled. Thanks

## 2022-10-12 ENCOUNTER — IMMUNIZATION (OUTPATIENT)
Dept: FAMILY MEDICINE | Facility: CLINIC | Age: 57
End: 2022-10-12
Payer: COMMERCIAL

## 2022-10-12 DIAGNOSIS — Z23 NEED FOR VACCINATION: Primary | ICD-10-CM

## 2022-10-12 PROCEDURE — 0124A COVID-19, MRNA, LNP-S, BIVALENT BOOSTER, PF, 30 MCG/0.3 ML DOSE: CPT | Mod: PBBFAC | Performed by: FAMILY MEDICINE

## 2022-10-12 PROCEDURE — 91312 COVID-19, MRNA, LNP-S, BIVALENT BOOSTER, PF, 30 MCG/0.3 ML DOSE: CPT | Mod: S$GLB,,, | Performed by: FAMILY MEDICINE

## 2022-10-12 PROCEDURE — 91312 COVID-19, MRNA, LNP-S, BIVALENT BOOSTER, PF, 30 MCG/0.3 ML DOSE: ICD-10-PCS | Mod: S$GLB,,, | Performed by: FAMILY MEDICINE

## 2022-10-27 ENCOUNTER — PATIENT MESSAGE (OUTPATIENT)
Dept: FAMILY MEDICINE | Facility: CLINIC | Age: 57
End: 2022-10-27
Payer: COMMERCIAL

## 2022-10-27 DIAGNOSIS — Z15.89 MTHFR GENE MUTATION: Primary | ICD-10-CM

## 2022-11-01 ENCOUNTER — LAB VISIT (OUTPATIENT)
Dept: LAB | Facility: HOSPITAL | Age: 57
End: 2022-11-01
Attending: FAMILY MEDICINE
Payer: COMMERCIAL

## 2022-11-01 DIAGNOSIS — Z00.00 WELLNESS EXAMINATION: ICD-10-CM

## 2022-11-01 DIAGNOSIS — Z15.89 MTHFR GENE MUTATION: ICD-10-CM

## 2022-11-01 LAB
ALBUMIN SERPL BCP-MCNC: 3.6 G/DL (ref 3.5–5.2)
ALP SERPL-CCNC: 44 U/L (ref 55–135)
ALT SERPL W/O P-5'-P-CCNC: 16 U/L (ref 10–44)
ANION GAP SERPL CALC-SCNC: 8 MMOL/L (ref 8–16)
AST SERPL-CCNC: 24 U/L (ref 10–40)
BASOPHILS # BLD AUTO: 0.05 K/UL (ref 0–0.2)
BASOPHILS NFR BLD: 1 % (ref 0–1.9)
BILIRUB SERPL-MCNC: 0.1 MG/DL (ref 0.1–1)
BUN SERPL-MCNC: 11 MG/DL (ref 6–20)
CALCIUM SERPL-MCNC: 8.9 MG/DL (ref 8.7–10.5)
CHLORIDE SERPL-SCNC: 108 MMOL/L (ref 95–110)
CHOLEST SERPL-MCNC: 216 MG/DL (ref 120–199)
CHOLEST/HDLC SERPL: 2.8 {RATIO} (ref 2–5)
CO2 SERPL-SCNC: 21 MMOL/L (ref 23–29)
CREAT SERPL-MCNC: 0.7 MG/DL (ref 0.5–1.4)
DIFFERENTIAL METHOD: ABNORMAL
EOSINOPHIL # BLD AUTO: 0.2 K/UL (ref 0–0.5)
EOSINOPHIL NFR BLD: 2.9 % (ref 0–8)
ERYTHROCYTE [DISTWIDTH] IN BLOOD BY AUTOMATED COUNT: 11.9 % (ref 11.5–14.5)
EST. GFR  (NO RACE VARIABLE): >60 ML/MIN/1.73 M^2
ESTIMATED AVG GLUCOSE: 103 MG/DL (ref 68–131)
FOLATE SERPL-MCNC: 19.3 NG/ML (ref 4–24)
GLUCOSE SERPL-MCNC: 87 MG/DL (ref 70–110)
HBA1C MFR BLD: 5.2 % (ref 4–5.6)
HCT VFR BLD AUTO: 37.5 % (ref 37–48.5)
HDLC SERPL-MCNC: 76 MG/DL (ref 40–75)
HDLC SERPL: 35.2 % (ref 20–50)
HGB BLD-MCNC: 12.2 G/DL (ref 12–16)
IMM GRANULOCYTES # BLD AUTO: 0.01 K/UL (ref 0–0.04)
IMM GRANULOCYTES NFR BLD AUTO: 0.2 % (ref 0–0.5)
LDLC SERPL CALC-MCNC: 111 MG/DL (ref 63–159)
LYMPHOCYTES # BLD AUTO: 1.4 K/UL (ref 1–4.8)
LYMPHOCYTES NFR BLD: 26.1 % (ref 18–48)
MCH RBC QN AUTO: 32.3 PG (ref 27–31)
MCHC RBC AUTO-ENTMCNC: 32.5 G/DL (ref 32–36)
MCV RBC AUTO: 99 FL (ref 82–98)
MONOCYTES # BLD AUTO: 0.4 K/UL (ref 0.3–1)
MONOCYTES NFR BLD: 7.2 % (ref 4–15)
NEUTROPHILS # BLD AUTO: 3.3 K/UL (ref 1.8–7.7)
NEUTROPHILS NFR BLD: 62.6 % (ref 38–73)
NONHDLC SERPL-MCNC: 140 MG/DL
NRBC BLD-RTO: 0 /100 WBC
PLATELET # BLD AUTO: 285 K/UL (ref 150–450)
PMV BLD AUTO: 10.3 FL (ref 9.2–12.9)
POTASSIUM SERPL-SCNC: 4.4 MMOL/L (ref 3.5–5.1)
PROT SERPL-MCNC: 6.6 G/DL (ref 6–8.4)
RBC # BLD AUTO: 3.78 M/UL (ref 4–5.4)
SODIUM SERPL-SCNC: 137 MMOL/L (ref 136–145)
TRIGL SERPL-MCNC: 145 MG/DL (ref 30–150)
TSH SERPL DL<=0.005 MIU/L-ACNC: 2.36 UIU/ML (ref 0.4–4)
VIT B12 SERPL-MCNC: 435 PG/ML (ref 210–950)
WBC # BLD AUTO: 5.25 K/UL (ref 3.9–12.7)

## 2022-11-01 PROCEDURE — 82652 VIT D 1 25-DIHYDROXY: CPT | Performed by: FAMILY MEDICINE

## 2022-11-01 PROCEDURE — 83921 ORGANIC ACID SINGLE QUANT: CPT | Performed by: FAMILY MEDICINE

## 2022-11-01 PROCEDURE — 83036 HEMOGLOBIN GLYCOSYLATED A1C: CPT | Performed by: FAMILY MEDICINE

## 2022-11-01 PROCEDURE — 80061 LIPID PANEL: CPT | Performed by: FAMILY MEDICINE

## 2022-11-01 PROCEDURE — 82607 VITAMIN B-12: CPT | Performed by: FAMILY MEDICINE

## 2022-11-01 PROCEDURE — 82746 ASSAY OF FOLIC ACID SERUM: CPT | Performed by: FAMILY MEDICINE

## 2022-11-01 PROCEDURE — 85025 COMPLETE CBC W/AUTO DIFF WBC: CPT | Performed by: FAMILY MEDICINE

## 2022-11-01 PROCEDURE — 83090 ASSAY OF HOMOCYSTEINE: CPT | Performed by: FAMILY MEDICINE

## 2022-11-01 PROCEDURE — 84443 ASSAY THYROID STIM HORMONE: CPT | Performed by: FAMILY MEDICINE

## 2022-11-01 PROCEDURE — 80053 COMPREHEN METABOLIC PANEL: CPT | Performed by: FAMILY MEDICINE

## 2022-11-02 LAB — HCYS SERPL-SCNC: 4 UMOL/L (ref 4–15.5)

## 2022-11-04 LAB — 1,25(OH)2D3 SERPL-MCNC: 58 PG/ML (ref 20–79)

## 2022-11-05 LAB — METHYLMALONATE SERPL-SCNC: 0.11 UMOL/L

## 2022-11-29 ENCOUNTER — PATIENT MESSAGE (OUTPATIENT)
Dept: FAMILY MEDICINE | Facility: CLINIC | Age: 57
End: 2022-11-29
Payer: COMMERCIAL

## 2022-12-13 ENCOUNTER — OFFICE VISIT (OUTPATIENT)
Dept: OBSTETRICS AND GYNECOLOGY | Facility: CLINIC | Age: 57
End: 2022-12-13
Payer: COMMERCIAL

## 2022-12-13 VITALS
WEIGHT: 160.06 LBS | BODY MASS INDEX: 29.45 KG/M2 | HEIGHT: 62 IN | DIASTOLIC BLOOD PRESSURE: 80 MMHG | SYSTOLIC BLOOD PRESSURE: 140 MMHG

## 2022-12-13 DIAGNOSIS — A63.0 HPV (HUMAN PAPILLOMA VIRUS) ANOGENITAL INFECTION: ICD-10-CM

## 2022-12-13 DIAGNOSIS — R23.2 HOT FLASHES: ICD-10-CM

## 2022-12-13 DIAGNOSIS — Z01.419 GYNECOLOGIC EXAM NORMAL: Primary | ICD-10-CM

## 2022-12-13 DIAGNOSIS — Z79.890 HORMONE REPLACEMENT THERAPY (HRT): ICD-10-CM

## 2022-12-13 DIAGNOSIS — F41.9 ANXIETY: ICD-10-CM

## 2022-12-13 DIAGNOSIS — N95.0 POSTMENOPAUSAL BLEEDING: ICD-10-CM

## 2022-12-13 PROCEDURE — 88175 CYTOPATH C/V AUTO FLUID REDO: CPT | Performed by: OBSTETRICS & GYNECOLOGY

## 2022-12-13 PROCEDURE — 58100 PR BIOPSY OF UTERUS LINING: ICD-10-PCS | Mod: S$GLB,,, | Performed by: OBSTETRICS & GYNECOLOGY

## 2022-12-13 PROCEDURE — 3077F PR MOST RECENT SYSTOLIC BLOOD PRESSURE >= 140 MM HG: ICD-10-PCS | Mod: CPTII,S$GLB,, | Performed by: OBSTETRICS & GYNECOLOGY

## 2022-12-13 PROCEDURE — 88305 TISSUE EXAM BY PATHOLOGIST: CPT | Mod: 26,,, | Performed by: PATHOLOGY

## 2022-12-13 PROCEDURE — 3077F SYST BP >= 140 MM HG: CPT | Mod: CPTII,S$GLB,, | Performed by: OBSTETRICS & GYNECOLOGY

## 2022-12-13 PROCEDURE — 3079F DIAST BP 80-89 MM HG: CPT | Mod: CPTII,S$GLB,, | Performed by: OBSTETRICS & GYNECOLOGY

## 2022-12-13 PROCEDURE — 1159F MED LIST DOCD IN RCRD: CPT | Mod: CPTII,S$GLB,, | Performed by: OBSTETRICS & GYNECOLOGY

## 2022-12-13 PROCEDURE — 58100 BIOPSY OF UTERUS LINING: CPT | Mod: S$GLB,,, | Performed by: OBSTETRICS & GYNECOLOGY

## 2022-12-13 PROCEDURE — 99396 PR PREVENTIVE VISIT,EST,40-64: ICD-10-PCS | Mod: 25,S$GLB,, | Performed by: OBSTETRICS & GYNECOLOGY

## 2022-12-13 PROCEDURE — 1159F PR MEDICATION LIST DOCUMENTED IN MEDICAL RECORD: ICD-10-PCS | Mod: CPTII,S$GLB,, | Performed by: OBSTETRICS & GYNECOLOGY

## 2022-12-13 PROCEDURE — 88305 TISSUE EXAM BY PATHOLOGIST: CPT | Performed by: PATHOLOGY

## 2022-12-13 PROCEDURE — 87624 HPV HI-RISK TYP POOLED RSLT: CPT | Performed by: OBSTETRICS & GYNECOLOGY

## 2022-12-13 PROCEDURE — 3044F PR MOST RECENT HEMOGLOBIN A1C LEVEL <7.0%: ICD-10-PCS | Mod: CPTII,S$GLB,, | Performed by: OBSTETRICS & GYNECOLOGY

## 2022-12-13 PROCEDURE — 99396 PREV VISIT EST AGE 40-64: CPT | Mod: 25,S$GLB,, | Performed by: OBSTETRICS & GYNECOLOGY

## 2022-12-13 PROCEDURE — 3079F PR MOST RECENT DIASTOLIC BLOOD PRESSURE 80-89 MM HG: ICD-10-PCS | Mod: CPTII,S$GLB,, | Performed by: OBSTETRICS & GYNECOLOGY

## 2022-12-13 PROCEDURE — 3044F HG A1C LEVEL LT 7.0%: CPT | Mod: CPTII,S$GLB,, | Performed by: OBSTETRICS & GYNECOLOGY

## 2022-12-13 PROCEDURE — 88305 TISSUE EXAM BY PATHOLOGIST: ICD-10-PCS | Mod: 26,,, | Performed by: PATHOLOGY

## 2022-12-13 PROCEDURE — 3008F BODY MASS INDEX DOCD: CPT | Mod: CPTII,S$GLB,, | Performed by: OBSTETRICS & GYNECOLOGY

## 2022-12-13 PROCEDURE — 3008F PR BODY MASS INDEX (BMI) DOCUMENTED: ICD-10-PCS | Mod: CPTII,S$GLB,, | Performed by: OBSTETRICS & GYNECOLOGY

## 2022-12-13 PROCEDURE — 99999 PR PBB SHADOW E&M-EST. PATIENT-LVL III: CPT | Mod: PBBFAC,,, | Performed by: OBSTETRICS & GYNECOLOGY

## 2022-12-13 PROCEDURE — 99999 PR PBB SHADOW E&M-EST. PATIENT-LVL III: ICD-10-PCS | Mod: PBBFAC,,, | Performed by: OBSTETRICS & GYNECOLOGY

## 2022-12-13 RX ORDER — PROGESTERONE 200 MG/1
200 CAPSULE ORAL NIGHTLY
Qty: 30 CAPSULE | Refills: 11 | Status: SHIPPED | OUTPATIENT
Start: 2022-12-13 | End: 2023-12-28

## 2022-12-13 RX ORDER — ESTRADIOL 2 MG/1
2 TABLET ORAL DAILY
Qty: 30 TABLET | Refills: 11 | Status: SHIPPED | OUTPATIENT
Start: 2022-12-13 | End: 2023-12-28

## 2022-12-13 NOTE — PROGRESS NOTES
Chief Complaint   Patient presents with    Well Woman       History of Present Illness: Kirsten Doshi is a 56 y.o. female that presents today 2022 for well gyn visit.  She reports 5 times a year she has 10 hours starting in the morning for 2-3 days of tachycardia, anxiety, trouble sleeping, nausea and diarrhea  for a week prior to any vaginal bleeding.  She notices it resolve after the bleeding.   She reports much less bleeding this past year. She is on lopressor and increases her dose during these times.   She reports occasional bleeding.     Past Medical History:   Diagnosis Date    Colon polyps     Endometriosis     PVC (premature ventricular contraction)     Serotonin syndrome     anxiety       Past Surgical History:   Procedure Laterality Date    BTL       SECTION      X2    left marsupilization      right salpingectomy      ectopic       Current Outpatient Medications   Medication Sig Dispense Refill    acetylcysteine 600 mg Tab Take by mouth.      ALPRAZolam (XANAX) 0.25 MG tablet TAKE ONE TABLET BY MOUTH THREE TIMES A DAY AS NEEDED 100 tablet 1    beta-carotene,A,-vits C,E/mins (ANTIOXIDANT FORMULA ORAL) Take by mouth.      levomefolate calcium (DEPLIN ORAL) Take by mouth.      metoprolol tartrate (LOPRESSOR) 25 MG tablet Take 0.5 tablets (12.5 mg total) by mouth once daily. 45 tablet 6    multivitamin capsule Take 1 capsule by mouth once daily.      estradioL (ESTRACE) 2 MG tablet Take 1 tablet (2 mg total) by mouth once daily. 30 tablet 11    progesterone (PROMETRIUM) 200 MG capsule Take 1 capsule (200 mg total) by mouth nightly. 30 capsule 11    testosterone cypionate (DEPOTESTOTERONE CYPIONATE) 200 mg/mL injection Inject into the muscle.       No current facility-administered medications for this visit.       Review of patient's allergies indicates:   Allergen Reactions    Penicillins        Family History   Problem Relation Age of Onset    Breast cancer Paternal Aunt     Ovarian cancer  "Neg Hx        Social History     Socioeconomic History    Marital status:     Number of children: 2   Occupational History    Occupation: radiologist     Employer: [employer]   Tobacco Use    Smoking status: Former    Smokeless tobacco: Never   Substance and Sexual Activity    Alcohol use: Yes    Drug use: Never    Sexual activity: Yes       OB History    Para Term  AB Living   3 2 2   1     SAB IAB Ectopic Multiple Live Births       1   2      # Outcome Date GA Lbr Danyel/2nd Weight Sex Delivery Anes PTL Lv   3 Ectopic            2 Term      CS-LTranv      1 Term      CS-LTranv          Review of Symptoms:  GENERAL: Denies weight gain or weight loss. Feeling well overall.   SKIN: Denies rash or lesions.   HEAD: Denies head injury or headache.   NODES: Denies enlarged lymph nodes.   CHEST: Denies chest pain or shortness of breath.   CARDIOVASCULAR: Denies palpitations or left sided chest pain.   ABDOMEN: No abdominal pain, constipation, diarrhea, nausea, vomiting or rectal bleeding.   URINARY: No frequency, dysuria, hematuria, or burning on urination.  HEMATOLOGIC: No easy bruisability or excessive bleeding.   MUSCULOSKELETAL: Denies joint pain or swelling.     BP (!) 140/80   Ht 5' 2" (1.575 m)   Wt 72.6 kg (160 lb 0.9 oz)   LMP 2020 (Approximate)   Physical Exam:  APPEARANCE: Well nourished, well developed, in no acute distress.  SKIN: Normal skin turgor, no lesions.  NECK: Neck symmetric without masses   RESPIRATORY: Normal respiratory effort with no retractions or use of accessory muscles  CARDIOVASCULAR: Peripheral vascular system with no swelling no varicosities and palpation of pulses normal  LYMPHATIC: No enlargements of the lymph nodes noted in the neck, axillae, or groin  ABDOMEN: Soft. No tenderness or masses. No hepatosplenomegaly. No hernias.  BREASTS: Symmetrical, no skin changes or visible lesions. No palpable masses, nipple discharge or adenopathy bilaterally.  PELVIC: Normal " external female genitalia without lesions. Normal hair distribution. Adequate perineal body, normal urethral meatus. Urethra with no masses.  Bladder nontender. Vagina moist and well rugated without lesions or discharge. Cervix pink and without lesions. No significant cystocele or rectocele. Bimanual exam showed uterus normal size, shape, position, mobile and nontender. Adnexa without masses or tenderness. Urethra and bladder normal.   EXTREMITIES: No clubbing cyanosis or edema.    ENDOMETRIAL BIOPSY:    Female patient presents for an endometrial biopsy due to abnormal bleeding.      UPT is negative.    PRE ENDOMETRIAL BIOPSY COUNSELING:  The patient was informed of the risk of bleeding, infection, uterine perforation and pain and that the test will rule-out endometrial cancer with accuracy greater than 95%. She was counseled on the alternatives to endometrial biopsy and agrees to proceed.    PROCEDURE:  TIME OUT PERFORMED.  The cervix was visualized with a speculum.  A single tooth tenaculum was placed on the anterior lip prior to the biopsy.  A sterile endometrial pipelle was passed without difficulty to a depth of 8 cm.  Adequate endometrial tissue was obtained.    The specimen was placed in formalyn and sent to Pathology for histology evaluation.  The patient tolerated the procedure well.    ASSESSMENT:   Abnormal uterine bleeding  626.8.    POST ENDOMETRIAL BIOPSY COUNSELING:  Manage post biopsy cramping with NSAIDs or Tylenol.  Expect spotting or light bleeding for a few days.  Report bleeding heavier than a period, fever > 101.0 F, worsening pain or a foul smelling vaginal discharge.    Counseling lasted approximately 15 minutes and all her questions were answered.    FOLLOW-UP: Pending biopsy results.      ASSESSMENT/PLAN:  Gynecologic exam normal  -     Liquid-Based Pap Smear, Screening  -     HPV High Risk Genotypes, PCR    Hormone replacement therapy (HRT)  -     Testosterone, free; Future; Expected date:  12/13/2022  -     ESTRADIOL; Future; Expected date: 12/13/2022  -     Progesterone; Future; Expected date: 12/13/2022  -     estradioL (ESTRACE) 2 MG tablet; Take 1 tablet (2 mg total) by mouth once daily.  Dispense: 30 tablet; Refill: 11  -     progesterone (PROMETRIUM) 200 MG capsule; Take 1 capsule (200 mg total) by mouth nightly.  Dispense: 30 capsule; Refill: 11  -     US Pelvis Comp with Transvag NON-OB (xpd; Future; Expected date: 12/13/2022    Postmenopausal bleeding  -     US Pelvis Comp with Transvag NON-OB (xpd; Future; Expected date: 12/13/2022  -     Specimen to Pathology, Ob/Gyn    Hot flashes  Comments:  least bothersome of her symptoms    Anxiety    HPV (human papilloma virus) anogenital infection  Comments:  2016    Hormone replacement therapy (HRT)  Comments:  taking unopposed estrogen for 3 years from Dr. Smith until 2021  Orders:  -     Testosterone, free; Future; Expected date: 12/13/2022  -     ESTRADIOL; Future; Expected date: 12/13/2022  -     Progesterone; Future; Expected date: 12/13/2022  -     estradioL (ESTRACE) 2 MG tablet; Take 1 tablet (2 mg total) by mouth once daily.  Dispense: 30 tablet; Refill: 11  -     progesterone (PROMETRIUM) 200 MG capsule; Take 1 capsule (200 mg total) by mouth nightly.  Dispense: 30 capsule; Refill: 11  -     US Pelvis Comp with Transvag NON-OB (xpd; Future; Expected date: 12/13/2022          Patient was counseled today on Pelvic exams and Pap Smear guidelines.   We discussed STD screening if at high risk for a STD.  We discussed recommendation for breast cancer screening with mammogram every other year after the age of 40 and annually after the age of 50.    We discussed colon cancer screening when indicated.   Osteoporosis screening discussed when indicated.   She was advised to see her primary care physician for all other health maintenance.     FOLLOW-UP with me for next routine visit.

## 2022-12-19 ENCOUNTER — HOSPITAL ENCOUNTER (OUTPATIENT)
Dept: RADIOLOGY | Facility: HOSPITAL | Age: 57
Discharge: HOME | End: 2022-12-19
Attending: OBSTETRICS & GYNECOLOGY
Payer: COMMERCIAL

## 2022-12-19 DIAGNOSIS — N95.0 POSTMENOPAUSAL BLEEDING: ICD-10-CM

## 2022-12-19 DIAGNOSIS — Z79.890 HORMONE REPLACEMENT THERAPY (HRT): ICD-10-CM

## 2022-12-19 PROCEDURE — 76856 US PELVIS COMP WITH TRANSVAG NON-OB (XPD): ICD-10-PCS | Mod: 26,,, | Performed by: RADIOLOGY

## 2022-12-19 PROCEDURE — 76856 US EXAM PELVIC COMPLETE: CPT | Mod: 26,,, | Performed by: RADIOLOGY

## 2022-12-19 PROCEDURE — 76856 US EXAM PELVIC COMPLETE: CPT | Mod: TC,PN

## 2022-12-19 PROCEDURE — 76830 US PELVIS COMP WITH TRANSVAG NON-OB (XPD): ICD-10-PCS | Mod: 26,,, | Performed by: RADIOLOGY

## 2022-12-19 PROCEDURE — 76830 TRANSVAGINAL US NON-OB: CPT | Mod: 26,,, | Performed by: RADIOLOGY

## 2022-12-21 LAB
FINAL PATHOLOGIC DIAGNOSIS: NORMAL
GROSS: NORMAL
Lab: NORMAL

## 2023-03-28 ENCOUNTER — OFFICE VISIT (OUTPATIENT)
Dept: RHEUMATOLOGY | Facility: CLINIC | Age: 58
End: 2023-03-28
Payer: COMMERCIAL

## 2023-03-28 VITALS
DIASTOLIC BLOOD PRESSURE: 86 MMHG | BODY MASS INDEX: 29.49 KG/M2 | HEIGHT: 62 IN | WEIGHT: 160.25 LBS | SYSTOLIC BLOOD PRESSURE: 131 MMHG | HEART RATE: 71 BPM

## 2023-03-28 DIAGNOSIS — M25.541 ARTHRALGIA OF BOTH HANDS: ICD-10-CM

## 2023-03-28 DIAGNOSIS — M25.50 POLYARTHRALGIA: Primary | ICD-10-CM

## 2023-03-28 DIAGNOSIS — M25.542 ARTHRALGIA OF BOTH HANDS: ICD-10-CM

## 2023-03-28 DIAGNOSIS — M25.521 RIGHT ELBOW PAIN: ICD-10-CM

## 2023-03-28 PROCEDURE — 3008F PR BODY MASS INDEX (BMI) DOCUMENTED: ICD-10-PCS | Mod: CPTII,S$GLB,, | Performed by: INTERNAL MEDICINE

## 2023-03-28 PROCEDURE — 3075F PR MOST RECENT SYSTOLIC BLOOD PRESS GE 130-139MM HG: ICD-10-PCS | Mod: CPTII,S$GLB,, | Performed by: INTERNAL MEDICINE

## 2023-03-28 PROCEDURE — 3079F PR MOST RECENT DIASTOLIC BLOOD PRESSURE 80-89 MM HG: ICD-10-PCS | Mod: CPTII,S$GLB,, | Performed by: INTERNAL MEDICINE

## 2023-03-28 PROCEDURE — 3008F BODY MASS INDEX DOCD: CPT | Mod: CPTII,S$GLB,, | Performed by: INTERNAL MEDICINE

## 2023-03-28 PROCEDURE — 99999 PR PBB SHADOW E&M-EST. PATIENT-LVL IV: ICD-10-PCS | Mod: PBBFAC,,, | Performed by: INTERNAL MEDICINE

## 2023-03-28 PROCEDURE — 99204 OFFICE O/P NEW MOD 45 MIN: CPT | Mod: S$GLB,,, | Performed by: INTERNAL MEDICINE

## 2023-03-28 PROCEDURE — 99204 PR OFFICE/OUTPT VISIT, NEW, LEVL IV, 45-59 MIN: ICD-10-PCS | Mod: S$GLB,,, | Performed by: INTERNAL MEDICINE

## 2023-03-28 PROCEDURE — 1159F PR MEDICATION LIST DOCUMENTED IN MEDICAL RECORD: ICD-10-PCS | Mod: CPTII,S$GLB,, | Performed by: INTERNAL MEDICINE

## 2023-03-28 PROCEDURE — 1159F MED LIST DOCD IN RCRD: CPT | Mod: CPTII,S$GLB,, | Performed by: INTERNAL MEDICINE

## 2023-03-28 PROCEDURE — 3079F DIAST BP 80-89 MM HG: CPT | Mod: CPTII,S$GLB,, | Performed by: INTERNAL MEDICINE

## 2023-03-28 PROCEDURE — 99999 PR PBB SHADOW E&M-EST. PATIENT-LVL IV: CPT | Mod: PBBFAC,,, | Performed by: INTERNAL MEDICINE

## 2023-03-28 PROCEDURE — 3075F SYST BP GE 130 - 139MM HG: CPT | Mod: CPTII,S$GLB,, | Performed by: INTERNAL MEDICINE

## 2023-03-28 NOTE — PROGRESS NOTES
Rapid3 Question Responses and Scores 3/27/2023   MDHAQ Score 0.1   Psychologic Score 0   Pain Score 2   When you awakened in the morning OVER THE LAST WEEK, did you feel stiff? Yes   If Yes, please indicate the number of hours until you are as limber as you will be for the day 1   Fatigue Score 0.5   Global Health Score 0   RAPID3 Score 0.78     Answers submitted by the patient for this visit:  Rheumatology Questionnaire (Submitted on 3/27/2023)  fever: No  eye redness: No  mouth sores: No  headaches: No  shortness of breath: No  chest pain: Yes  trouble swallowing: No  diarrhea: No  constipation: Yes  unexpected weight change: No  genital sore: No  dysuria: No  During the last 3 days, have you had a skin rash?: No  Bruises or bleeds easily: No  cough: No

## 2023-03-28 NOTE — PROGRESS NOTES
"Subjective:      Patient ID: Kirsten Doshi is a 57 y.o. female.    Chief Complaint: Disease Management    HPI 57 year old F  with SVT,endometriosis, menopause here for evaluation.  She developed raynauds several years ago. She has had involvement of only right second finger.  She had had 3 episodes of Raynauds. She just had hand and foot mouth disease in February.  Reports she is losing her hair and was told it was hormonal.  Denies any rashes, oral ulcers, or pleurisy.  Reports mild dry eyes. Reports mild dry mouth.  She has pain in right elbows. She has pain around cmc  in right hand. She reports some stiffness in hands for less than hour in the morning.  She has pain in both shoulders. She had injection into left shoulder with improvement.  She has had right foot plantar fasciitis for several months.  She took course of oral antifungal and improved her nails. Denies personal or family history of psoriasis.      Family hx: negative for RA or SLE       Past Medical History:   Diagnosis Date    Colon polyps     Endometriosis     PVC (premature ventricular contraction)     Serotonin syndrome     anxiety       Review of Systems   Constitutional:  Negative for fever and unexpected weight change.   HENT:  Negative for mouth sores and trouble swallowing.    Eyes:  Negative for redness.   Respiratory:  Negative for cough and shortness of breath.    Cardiovascular:  Positive for chest pain.   Gastrointestinal:  Positive for constipation. Negative for diarrhea.   Genitourinary:  Negative for dysuria and genital sores.   Skin:  Negative for rash.   Neurological:  Negative for headaches.   Hematological:  Does not bruise/bleed easily.  see HPI      Objective:   BP (!) 143/93   Pulse 77   Ht 5' 2" (1.575 m)   Wt 72.7 kg (160 lb 4.4 oz)   LMP 06/22/2020 (Approximate)   BMI 29.31 kg/m²   Physical Exam   Constitutional: She is oriented to person, place, and time.   HENT:   Head: Normocephalic and atraumatic.   Right Ear: " External ear normal.   Left Ear: External ear normal.   Nose: Nose normal.   Mouth/Throat: Oropharynx is clear and moist. No oropharyngeal exudate.   Eyes: Pupils are equal, round, and reactive to light. Conjunctivae are normal. Right eye exhibits no discharge. Left eye exhibits no discharge. No scleral icterus.   Neck: No JVD present. No thyromegaly present.   Cardiovascular: Normal rate, regular rhythm and normal heart sounds. Exam reveals no gallop and no friction rub.   No murmur heard.  Pulmonary/Chest: Effort normal and breath sounds normal. No respiratory distress. She has no wheezes. She has no rales. She exhibits no tenderness.   Abdominal: Soft. Bowel sounds are normal. She exhibits no distension and no mass. There is no abdominal tenderness. There is no rebound and no guarding.   Musculoskeletal:         General: No tenderness.      Cervical back: Neck supple.   Lymphadenopathy:     She has no cervical adenopathy.   Neurological: She is alert and oriented to person, place, and time. No cranial nerve deficit. Gait normal. Coordination normal.   Skin: Skin is dry. No rash noted. No erythema. No pallor.   Psychiatric: Affect and judgment normal.       No data to display     Assessment:   57 year old F  with SVT,endometriosis, menopause here for evaluation.  She has had 2 episodes of raynauyds, arthralgias and hair loss thought to be hormomal.  On exam, I do not detect synovitis or evidence of connective tissue disease.    1. Arthralgia of both hands          Plan:     Problem List Items Addressed This Visit    None  Visit Diagnoses       Arthralgia of both hands              Labs  Xrays    Rtc prn  45  * minutes of total time spent on the encounter, which includes face to face time and non-face to face time preparing to see the patient (eg, review of tests), Obtaining and/or reviewing separately obtained history, Documenting clinical information in the electronic or other health record, Independently  interpreting results (not separately reported) and communicating results to the patient/family/caregiver, or Care coordination (not separately reported).

## 2023-03-29 LAB — CRC RECOMMENDATION EXT: NORMAL

## 2023-03-31 ENCOUNTER — HOSPITAL ENCOUNTER (OUTPATIENT)
Dept: RADIOLOGY | Facility: HOSPITAL | Age: 58
Discharge: HOME | End: 2023-03-31
Attending: INTERNAL MEDICINE
Payer: COMMERCIAL

## 2023-03-31 ENCOUNTER — PATIENT MESSAGE (OUTPATIENT)
Dept: RHEUMATOLOGY | Facility: CLINIC | Age: 58
End: 2023-03-31
Payer: COMMERCIAL

## 2023-03-31 DIAGNOSIS — M25.50 POLYARTHRALGIA: ICD-10-CM

## 2023-03-31 PROCEDURE — 73030 XR SHOULDER COMPLETE 2 OR MORE VIEWS BILATERAL: ICD-10-PCS | Mod: 26,50,, | Performed by: RADIOLOGY

## 2023-03-31 PROCEDURE — 72202 X-RAY EXAM SI JOINTS 3/> VWS: CPT | Mod: TC,FY,PO

## 2023-03-31 PROCEDURE — 73030 X-RAY EXAM OF SHOULDER: CPT | Mod: TC,50,FY,PO

## 2023-03-31 PROCEDURE — 77077 JOINT SURVEY SINGLE VIEW: CPT | Mod: TC,PO

## 2023-03-31 PROCEDURE — 72202 X-RAY EXAM SI JOINTS 3/> VWS: CPT | Mod: 26,,, | Performed by: RADIOLOGY

## 2023-03-31 PROCEDURE — 72202 XR SACROILIAC JOINTS COMPLETE: ICD-10-PCS | Mod: 26,,, | Performed by: RADIOLOGY

## 2023-03-31 PROCEDURE — 77077 XR ARTHRITIS SURVEY: ICD-10-PCS | Mod: 26,,, | Performed by: RADIOLOGY

## 2023-03-31 PROCEDURE — 73030 X-RAY EXAM OF SHOULDER: CPT | Mod: 26,50,, | Performed by: RADIOLOGY

## 2023-03-31 PROCEDURE — 77077 JOINT SURVEY SINGLE VIEW: CPT | Mod: 26,,, | Performed by: RADIOLOGY

## 2023-04-03 ENCOUNTER — PATIENT OUTREACH (OUTPATIENT)
Dept: ADMINISTRATIVE | Facility: HOSPITAL | Age: 58
End: 2023-04-03
Payer: COMMERCIAL

## 2023-04-05 DIAGNOSIS — M53.3 PAIN OF BOTH SACROILIAC JOINTS: Primary | ICD-10-CM

## 2023-04-18 ENCOUNTER — PATIENT MESSAGE (OUTPATIENT)
Dept: FAMILY MEDICINE | Facility: CLINIC | Age: 58
End: 2023-04-18
Payer: COMMERCIAL

## 2023-04-20 ENCOUNTER — TELEPHONE (OUTPATIENT)
Dept: FAMILY MEDICINE | Facility: CLINIC | Age: 58
End: 2023-04-20
Payer: COMMERCIAL

## 2023-07-03 ENCOUNTER — PATIENT MESSAGE (OUTPATIENT)
Dept: OBSTETRICS AND GYNECOLOGY | Facility: CLINIC | Age: 58
End: 2023-07-03
Payer: COMMERCIAL

## 2023-07-03 DIAGNOSIS — Z12.31 VISIT FOR SCREENING MAMMOGRAM: Primary | ICD-10-CM

## 2023-07-11 ENCOUNTER — HOSPITAL ENCOUNTER (OUTPATIENT)
Dept: RADIOLOGY | Facility: HOSPITAL | Age: 58
Discharge: HOME | End: 2023-07-11
Attending: OBSTETRICS & GYNECOLOGY
Payer: COMMERCIAL

## 2023-07-11 DIAGNOSIS — Z12.31 VISIT FOR SCREENING MAMMOGRAM: ICD-10-CM

## 2023-07-11 PROCEDURE — 77063 BREAST TOMOSYNTHESIS BI: CPT | Mod: 26,,, | Performed by: RADIOLOGY

## 2023-07-11 PROCEDURE — 77067 SCR MAMMO BI INCL CAD: CPT | Mod: TC

## 2023-07-11 PROCEDURE — 77067 MAMMO DIGITAL SCREENING BILAT WITH TOMO: ICD-10-PCS | Mod: 26,,, | Performed by: RADIOLOGY

## 2023-07-11 PROCEDURE — 77063 MAMMO DIGITAL SCREENING BILAT WITH TOMO: ICD-10-PCS | Mod: 26,,, | Performed by: RADIOLOGY

## 2023-07-11 PROCEDURE — 77067 SCR MAMMO BI INCL CAD: CPT | Mod: 26,,, | Performed by: RADIOLOGY

## 2023-07-28 ENCOUNTER — PATIENT MESSAGE (OUTPATIENT)
Dept: RHEUMATOLOGY | Facility: CLINIC | Age: 58
End: 2023-07-28
Payer: COMMERCIAL

## 2023-11-06 ENCOUNTER — CLINICAL SUPPORT (OUTPATIENT)
Dept: INTERNAL MEDICINE | Facility: CLINIC | Age: 58
End: 2023-11-06
Payer: COMMERCIAL

## 2023-11-06 DIAGNOSIS — Z00.00 ANNUAL PHYSICAL EXAM: Primary | ICD-10-CM

## 2023-11-06 LAB
ALBUMIN SERPL BCP-MCNC: 3.8 G/DL (ref 3.5–5.2)
ALP SERPL-CCNC: 37 U/L (ref 55–135)
ALT SERPL W/O P-5'-P-CCNC: 18 U/L (ref 10–44)
ANION GAP SERPL CALC-SCNC: 7 MMOL/L (ref 8–16)
AST SERPL-CCNC: 17 U/L (ref 10–40)
BILIRUB SERPL-MCNC: 0.3 MG/DL (ref 0.1–1)
BUN SERPL-MCNC: 12 MG/DL (ref 6–20)
CALCIUM SERPL-MCNC: 9.3 MG/DL (ref 8.7–10.5)
CHLORIDE SERPL-SCNC: 107 MMOL/L (ref 95–110)
CHOLEST SERPL-MCNC: 214 MG/DL (ref 120–199)
CHOLEST/HDLC SERPL: 2.5 {RATIO} (ref 2–5)
CO2 SERPL-SCNC: 26 MMOL/L (ref 23–29)
CREAT SERPL-MCNC: 0.7 MG/DL (ref 0.5–1.4)
ERYTHROCYTE [DISTWIDTH] IN BLOOD BY AUTOMATED COUNT: 12.2 % (ref 11.5–14.5)
EST. GFR  (NO RACE VARIABLE): >60 ML/MIN/1.73 M^2
ESTIMATED AVG GLUCOSE: 103 MG/DL (ref 68–131)
GLUCOSE SERPL-MCNC: 97 MG/DL (ref 70–110)
HBA1C MFR BLD: 5.2 % (ref 4–5.6)
HCT VFR BLD AUTO: 35.6 % (ref 37–48.5)
HDLC SERPL-MCNC: 87 MG/DL (ref 40–75)
HDLC SERPL: 40.7 % (ref 20–50)
HGB BLD-MCNC: 11.8 G/DL (ref 12–16)
LDLC SERPL CALC-MCNC: 105.4 MG/DL (ref 63–159)
MCH RBC QN AUTO: 32.2 PG (ref 27–31)
MCHC RBC AUTO-ENTMCNC: 33.1 G/DL (ref 32–36)
MCV RBC AUTO: 97 FL (ref 82–98)
NONHDLC SERPL-MCNC: 127 MG/DL
PLATELET # BLD AUTO: 263 K/UL (ref 150–450)
PMV BLD AUTO: 9.3 FL (ref 9.2–12.9)
POTASSIUM SERPL-SCNC: 4.4 MMOL/L (ref 3.5–5.1)
PROT SERPL-MCNC: 6.5 G/DL (ref 6–8.4)
RBC # BLD AUTO: 3.66 M/UL (ref 4–5.4)
SODIUM SERPL-SCNC: 140 MMOL/L (ref 136–145)
TRIGL SERPL-MCNC: 108 MG/DL (ref 30–150)
TSH SERPL DL<=0.005 MIU/L-ACNC: 2.29 UIU/ML (ref 0.4–4)
URATE SERPL-MCNC: 3.6 MG/DL (ref 2.4–5.7)
WBC # BLD AUTO: 5.63 K/UL (ref 3.9–12.7)

## 2023-11-06 PROCEDURE — 84550 ASSAY OF BLOOD/URIC ACID: CPT | Mod: PO | Performed by: FAMILY MEDICINE

## 2023-11-06 PROCEDURE — 85027 COMPLETE CBC AUTOMATED: CPT | Mod: PO | Performed by: FAMILY MEDICINE

## 2023-11-06 PROCEDURE — 84443 ASSAY THYROID STIM HORMONE: CPT | Performed by: FAMILY MEDICINE

## 2023-11-06 PROCEDURE — 80053 COMPREHEN METABOLIC PANEL: CPT | Mod: PO | Performed by: FAMILY MEDICINE

## 2023-11-06 PROCEDURE — 83036 HEMOGLOBIN GLYCOSYLATED A1C: CPT | Performed by: FAMILY MEDICINE

## 2023-11-06 PROCEDURE — 80061 LIPID PANEL: CPT | Performed by: FAMILY MEDICINE

## 2023-11-15 ENCOUNTER — OFFICE VISIT (OUTPATIENT)
Dept: FAMILY MEDICINE | Facility: CLINIC | Age: 58
End: 2023-11-15
Payer: COMMERCIAL

## 2023-11-15 VITALS
BODY MASS INDEX: 31.32 KG/M2 | WEIGHT: 170.19 LBS | SYSTOLIC BLOOD PRESSURE: 118 MMHG | DIASTOLIC BLOOD PRESSURE: 76 MMHG | HEART RATE: 75 BPM | OXYGEN SATURATION: 98 % | HEIGHT: 62 IN

## 2023-11-15 DIAGNOSIS — Z00.00 WELLNESS EXAMINATION: Primary | ICD-10-CM

## 2023-11-15 DIAGNOSIS — D64.9 ANEMIA, UNSPECIFIED TYPE: ICD-10-CM

## 2023-11-15 DIAGNOSIS — Z23 NEED FOR VACCINATION: ICD-10-CM

## 2023-11-15 PROCEDURE — 99396 PREV VISIT EST AGE 40-64: CPT | Mod: S$GLB,,, | Performed by: FAMILY MEDICINE

## 2023-11-15 PROCEDURE — 99999 PR PBB SHADOW E&M-EST. PATIENT-LVL IV: ICD-10-PCS | Mod: PBBFAC,,, | Performed by: FAMILY MEDICINE

## 2023-11-15 PROCEDURE — 1159F MED LIST DOCD IN RCRD: CPT | Mod: CPTII,S$GLB,, | Performed by: FAMILY MEDICINE

## 2023-11-15 PROCEDURE — 3008F BODY MASS INDEX DOCD: CPT | Mod: CPTII,S$GLB,, | Performed by: FAMILY MEDICINE

## 2023-11-15 PROCEDURE — 3074F SYST BP LT 130 MM HG: CPT | Mod: CPTII,S$GLB,, | Performed by: FAMILY MEDICINE

## 2023-11-15 PROCEDURE — 3078F PR MOST RECENT DIASTOLIC BLOOD PRESSURE < 80 MM HG: ICD-10-PCS | Mod: CPTII,S$GLB,, | Performed by: FAMILY MEDICINE

## 2023-11-15 PROCEDURE — 3044F PR MOST RECENT HEMOGLOBIN A1C LEVEL <7.0%: ICD-10-PCS | Mod: CPTII,S$GLB,, | Performed by: FAMILY MEDICINE

## 2023-11-15 PROCEDURE — 1159F PR MEDICATION LIST DOCUMENTED IN MEDICAL RECORD: ICD-10-PCS | Mod: CPTII,S$GLB,, | Performed by: FAMILY MEDICINE

## 2023-11-15 PROCEDURE — 1160F RVW MEDS BY RX/DR IN RCRD: CPT | Mod: CPTII,S$GLB,, | Performed by: FAMILY MEDICINE

## 2023-11-15 PROCEDURE — 3008F PR BODY MASS INDEX (BMI) DOCUMENTED: ICD-10-PCS | Mod: CPTII,S$GLB,, | Performed by: FAMILY MEDICINE

## 2023-11-15 PROCEDURE — 99999 PR PBB SHADOW E&M-EST. PATIENT-LVL IV: CPT | Mod: PBBFAC,,, | Performed by: FAMILY MEDICINE

## 2023-11-15 PROCEDURE — 90686 IIV4 VACC NO PRSV 0.5 ML IM: CPT | Mod: S$GLB,,, | Performed by: FAMILY MEDICINE

## 2023-11-15 PROCEDURE — 99396 PR PREVENTIVE VISIT,EST,40-64: ICD-10-PCS | Mod: S$GLB,,, | Performed by: FAMILY MEDICINE

## 2023-11-15 PROCEDURE — 3044F HG A1C LEVEL LT 7.0%: CPT | Mod: CPTII,S$GLB,, | Performed by: FAMILY MEDICINE

## 2023-11-15 PROCEDURE — 3078F DIAST BP <80 MM HG: CPT | Mod: CPTII,S$GLB,, | Performed by: FAMILY MEDICINE

## 2023-11-15 PROCEDURE — 90471 FLU VACCINE (QUAD) GREATER THAN OR EQUAL TO 3YO PRESERVATIVE FREE IM: ICD-10-PCS | Mod: S$GLB,,, | Performed by: FAMILY MEDICINE

## 2023-11-15 PROCEDURE — 3074F PR MOST RECENT SYSTOLIC BLOOD PRESSURE < 130 MM HG: ICD-10-PCS | Mod: CPTII,S$GLB,, | Performed by: FAMILY MEDICINE

## 2023-11-15 PROCEDURE — 90686 FLU VACCINE (QUAD) GREATER THAN OR EQUAL TO 3YO PRESERVATIVE FREE IM: ICD-10-PCS | Mod: S$GLB,,, | Performed by: FAMILY MEDICINE

## 2023-11-15 PROCEDURE — 1160F PR REVIEW ALL MEDS BY PRESCRIBER/CLIN PHARMACIST DOCUMENTED: ICD-10-PCS | Mod: CPTII,S$GLB,, | Performed by: FAMILY MEDICINE

## 2023-11-15 PROCEDURE — 90471 IMMUNIZATION ADMIN: CPT | Mod: S$GLB,,, | Performed by: FAMILY MEDICINE

## 2023-11-15 NOTE — PROGRESS NOTES
November 15, 2023                                                                                                                                                                                                                                                                                      Kirsten Doshi  [id number] [place] [id number]                                                                                                                                                                                                                                                                                                RE: Kirsten Doshi                                                        Clinic #:[id number]                                                                                                                                   Dear  Kirsten Doshi,                                                                                                                                           Thank you for allowing me to serve you and perform your Executive Health exam on November 15, 2023.   This letter will serve a brief summary of the history, physical findings, and laboratory/studies performed and recommendations at that time.                                                                                         REASON FOR VISIT: Executive Health Preventive Physical Examination    Past Medical History:   Diagnosis Date    Colon polyps     Endometriosis     PVC (premature ventricular contraction)     Serotonin syndrome     anxiety       Past Surgical History:   Procedure Laterality Date    BTL       SECTION      X2    left marsupilization      right salpingectomy      ectopic       Family History   Problem Relation Age of Onset    Breast cancer Paternal Aunt     Ovarian cancer Neg Hx        Social History     Socioeconomic History    Marital status:     Number of children: 2    Occupational History    Occupation: radiologist     Employer: [employer]   Tobacco Use    Smoking status: Former    Smokeless tobacco: Never   Substance and Sexual Activity    Alcohol use: Yes    Drug use: Never    Sexual activity: Yes     Social Determinants of Health     Stress: Stress Concern Present (7/1/2020)    [facility] - Occupational Stress Questionnaire     Feeling of Stress : To some extent       Allergies: Penicillins and Quinolones    Current Outpatient Medications   Medication Sig Dispense Refill    acetylcysteine 600 mg Tab Take by mouth.      ALPRAZolam (XANAX) 0.25 MG tablet TAKE ONE TABLET BY MOUTH THREE TIMES A DAY AS NEEDED 100 tablet 1    clobetasoL (TEMOVATE) 0.05 % external solution Apply to scalp 1-2x/day 50 mL 11    estradioL (ESTRACE) 2 MG tablet Take 1 tablet (2 mg total) by mouth once daily. 30 tablet 11    hydrOXYchloroQUINE (PLAQUENIL) 200 mg tablet 1 po daily x 2 wks then 1 po bid 60 tablet 6    ketoconazole (NIZORAL) 2 % shampoo Wash all scaling areas let sit 3 minutes then rinse 3x/wk 120 mL 11    levomefolate calcium (DEPLIN ORAL) Take by mouth.      minoxidiL (LONITEN) 2.5 MG tablet 1/4 po daily x 1 wk then 1/2 po daily x 1wk then 3/4 po daily x 1 wk then 1 po daily 90 tablet 3    multivitamin capsule Take 1 capsule by mouth once daily.      progesterone (PROMETRIUM) 200 MG capsule Take 1 capsule (200 mg total) by mouth nightly. 30 capsule 11    tolterodine (DETROL LA) 4 MG 24 hr capsule Take 4 mg by mouth.      tretinoin (RETIN-A) 0.025 % cream Apply pea sized amt to face every other night x 2 wks then nightly 20 g 11    diltiaZEM (CARDIZEM) 30 MG tablet Take 1 tablet by mouth once daily.      nitrofurantoin, macrocrystal-monohydrate, (MACROBID) 100 MG capsule Take 100 mg by mouth 2 (two) times daily.      terbinafine HCL (LAMISIL) 250 mg tablet Take 250 mg by mouth.       No current facility-administered medications for this visit.       REVIEW OF  "SYSTEMS:  No recent changes in weight, or complaints of fatigue. No recent changes in vision, or hearing. Denies frequent headaches.No recent changes in voice. No new or changing skin lesions. Denies abnormal bruising or bleeding.  Denies chest pain or sensation of skipped beats. No new onset of shortness of breath, or dyspnea on exertion. Denies abdominal discomfort, constipation, diarrhea,or blood in stool. Denies difficulty with urination.   No recent joint swelling or muscle discomfort. Denies pain or weakness in extremeties. No recent loss of balance. Denies problems with sleep or depression.        Remainder of the review of systems without pertinent positves at this time.                                                                              PHYSICAL EXAM:   VITAL SIGNS: /76   Pulse 75   Ht 5' 2" (1.575 m)   Wt 77.2 kg (170 lb 3.1 oz)   LMP 06/22/2020 (Approximate)   SpO2 98%   BMI 31.13 kg/m²   GENERAL APPEARANCE:  Well nourished and normally developed,  pleasant 57 y.o. female, in good spirits.  SKIN: Without rashes or overt lesions.  HEENT: Head normacephalic. There was no scleral icterus. Mucous membranes were moist. Dentition. Neck is supple, no thyromegally, or carotid bruits.  LUNGS: Clear to auscultation bilaterally. Normal respiratory effort.  HEART: Exam reveals regular rate and rhythm. First and second heart sounds normal. No murmurs, rubs or gallops.   ABDOMEN: Soft, non-tender, non-distended. Exam reveals normal bowl sounds, no masses, no organomegaly and no aortic enlargement.    EXTREMITIES:  Nonedematous, both femoral and pedal pulses are normal. No joint stiffness or tenderness. Full range of motion and strength, upper and lower bilaterally.      ASSESSMENT/RECOMMENDATIONS :  Wellness exam    At this time,  you appear to be in good medical condition.    Continue to work on regular exercise, maintenance of a healthy weight, balanced diet rich in fruits/vegetables and lean " protein, and avoidance of unhealthy habits like smoking and excessive alcohol intake.  I look forward to seeing you again next year.    Please contact me should you have any questions or concerns regarding physical findings, or my recommendations.       Sincerely yours,        TAPAN Hutchison M.D.  Department of Family Practice  [facility]

## 2023-12-28 DIAGNOSIS — Z79.890 HORMONE REPLACEMENT THERAPY (HRT): ICD-10-CM

## 2023-12-28 RX ORDER — PROGESTERONE 200 MG/1
200 CAPSULE ORAL NIGHTLY
Qty: 90 CAPSULE | Refills: 0 | Status: SHIPPED | OUTPATIENT
Start: 2023-12-28 | End: 2023-12-29 | Stop reason: SDUPTHER

## 2023-12-28 RX ORDER — ESTRADIOL 2 MG/1
2 TABLET ORAL DAILY
Qty: 90 TABLET | Refills: 0 | Status: SHIPPED | OUTPATIENT
Start: 2023-12-28 | End: 2023-12-29 | Stop reason: SDUPTHER

## 2023-12-28 NOTE — TELEPHONE ENCOUNTER
Refill Decision Note   Kirsten Doshi  is requesting a refill authorization.  Brief Assessment and Rationale for Refill:  Approve     Medication Therapy Plan:  LOV 12/2022;  FOVS in 1 week      Comments:     Note composed:4:23 PM 12/28/2023

## 2023-12-29 DIAGNOSIS — Z79.890 HORMONE REPLACEMENT THERAPY (HRT): ICD-10-CM

## 2024-01-02 RX ORDER — PROGESTERONE 200 MG/1
200 CAPSULE ORAL NIGHTLY
Qty: 90 CAPSULE | Refills: 0 | Status: SHIPPED | OUTPATIENT
Start: 2024-01-02

## 2024-01-02 RX ORDER — ESTRADIOL 2 MG/1
2 TABLET ORAL DAILY
Qty: 90 TABLET | Refills: 0 | Status: SHIPPED | OUTPATIENT
Start: 2024-01-02

## 2024-03-12 ENCOUNTER — PATIENT MESSAGE (OUTPATIENT)
Dept: OBSTETRICS AND GYNECOLOGY | Facility: CLINIC | Age: 59
End: 2024-03-12
Payer: COMMERCIAL

## 2024-03-12 DIAGNOSIS — N95.0 POSTMENOPAUSAL BLEEDING: ICD-10-CM

## 2024-03-12 DIAGNOSIS — D21.9 FIBROID: Primary | ICD-10-CM

## 2024-03-13 ENCOUNTER — HOSPITAL ENCOUNTER (OUTPATIENT)
Dept: RADIOLOGY | Facility: HOSPITAL | Age: 59
Discharge: HOME | End: 2024-03-13
Attending: OBSTETRICS & GYNECOLOGY
Payer: COMMERCIAL

## 2024-03-13 DIAGNOSIS — N95.0 POSTMENOPAUSAL BLEEDING: ICD-10-CM

## 2024-03-13 DIAGNOSIS — D21.9 FIBROID: ICD-10-CM

## 2024-03-13 PROCEDURE — 76856 US EXAM PELVIC COMPLETE: CPT | Mod: 26,,, | Performed by: RADIOLOGY

## 2024-03-13 PROCEDURE — 76830 TRANSVAGINAL US NON-OB: CPT | Mod: TC,PN

## 2024-03-13 PROCEDURE — 76830 TRANSVAGINAL US NON-OB: CPT | Mod: 26,,, | Performed by: RADIOLOGY

## 2024-03-14 ENCOUNTER — OFFICE VISIT (OUTPATIENT)
Dept: OBSTETRICS AND GYNECOLOGY | Facility: CLINIC | Age: 59
End: 2024-03-14
Payer: COMMERCIAL

## 2024-03-14 VITALS
SYSTOLIC BLOOD PRESSURE: 110 MMHG | DIASTOLIC BLOOD PRESSURE: 72 MMHG | BODY MASS INDEX: 29.78 KG/M2 | WEIGHT: 161.81 LBS | HEIGHT: 62 IN

## 2024-03-14 DIAGNOSIS — Z12.31 ENCOUNTER FOR SCREENING MAMMOGRAM FOR MALIGNANT NEOPLASM OF BREAST: ICD-10-CM

## 2024-03-14 DIAGNOSIS — N95.0 POSTMENOPAUSAL BLEEDING: ICD-10-CM

## 2024-03-14 DIAGNOSIS — Z79.890 HORMONE REPLACEMENT THERAPY (HRT): ICD-10-CM

## 2024-03-14 DIAGNOSIS — Z01.419 GYNECOLOGIC EXAM NORMAL: Primary | ICD-10-CM

## 2024-03-14 DIAGNOSIS — D21.9 FIBROID: ICD-10-CM

## 2024-03-14 DIAGNOSIS — A63.0 HPV (HUMAN PAPILLOMA VIRUS) ANOGENITAL INFECTION: ICD-10-CM

## 2024-03-14 PROCEDURE — 99999 PR PBB SHADOW E&M-EST. PATIENT-LVL III: CPT | Mod: PBBFAC,,, | Performed by: OBSTETRICS & GYNECOLOGY

## 2024-03-14 PROCEDURE — 88141 CYTOPATH C/V INTERPRET: CPT | Mod: ,,, | Performed by: PATHOLOGY

## 2024-03-14 PROCEDURE — 3008F BODY MASS INDEX DOCD: CPT | Mod: CPTII,S$GLB,, | Performed by: OBSTETRICS & GYNECOLOGY

## 2024-03-14 PROCEDURE — 1159F MED LIST DOCD IN RCRD: CPT | Mod: CPTII,S$GLB,, | Performed by: OBSTETRICS & GYNECOLOGY

## 2024-03-14 PROCEDURE — 87624 HPV HI-RISK TYP POOLED RSLT: CPT | Performed by: OBSTETRICS & GYNECOLOGY

## 2024-03-14 PROCEDURE — 88175 CYTOPATH C/V AUTO FLUID REDO: CPT | Performed by: PATHOLOGY

## 2024-03-14 PROCEDURE — 88305 TISSUE EXAM BY PATHOLOGIST: CPT | Mod: 26,,, | Performed by: PATHOLOGY

## 2024-03-14 PROCEDURE — 3074F SYST BP LT 130 MM HG: CPT | Mod: CPTII,S$GLB,, | Performed by: OBSTETRICS & GYNECOLOGY

## 2024-03-14 PROCEDURE — 99396 PREV VISIT EST AGE 40-64: CPT | Mod: 25,S$GLB,, | Performed by: OBSTETRICS & GYNECOLOGY

## 2024-03-14 PROCEDURE — 4010F ACE/ARB THERAPY RXD/TAKEN: CPT | Mod: CPTII,S$GLB,, | Performed by: OBSTETRICS & GYNECOLOGY

## 2024-03-14 PROCEDURE — 58100 BIOPSY OF UTERUS LINING: CPT | Mod: S$GLB,,, | Performed by: OBSTETRICS & GYNECOLOGY

## 2024-03-14 PROCEDURE — 88305 TISSUE EXAM BY PATHOLOGIST: CPT | Performed by: PATHOLOGY

## 2024-03-14 PROCEDURE — 3078F DIAST BP <80 MM HG: CPT | Mod: CPTII,S$GLB,, | Performed by: OBSTETRICS & GYNECOLOGY

## 2024-03-14 RX ORDER — PROGESTERONE 200 MG/1
200 CAPSULE ORAL NIGHTLY
Qty: 90 CAPSULE | Refills: 0 | Status: SHIPPED | OUTPATIENT
Start: 2024-03-14

## 2024-03-14 RX ORDER — ESTRADIOL 1 MG/1
1 TABLET ORAL DAILY
Qty: 90 TABLET | Refills: 3 | Status: SHIPPED | OUTPATIENT
Start: 2024-03-14 | End: 2025-03-14

## 2024-03-14 NOTE — PROGRESS NOTES
Chief Complaint   Patient presents with    Well Woman    Endometrial Biopsy       History of Present Illness: Kirsten Doshi is a 58 y.o. female that presents today 3/14/2024 with Patient's last menstrual period was 2020 (approximate).  for well gyn visit. She reports 3 episodes  for 2 days bright red bleeding in past 2 months.     Past Medical History:   Diagnosis Date    Colon polyps     Endometriosis     PVC (premature ventricular contraction)     Serotonin syndrome     anxiety       Past Surgical History:   Procedure Laterality Date    BTL       SECTION      X2    left marsupilization      right salpingectomy      ectopic       Outpatient Medications Prior to Visit   Medication Sig Dispense Refill    acetylcysteine 600 mg Tab Take by mouth.      ALPRAZolam (XANAX) 0.25 MG tablet TAKE ONE TABLET BY MOUTH THREE TIMES A DAY AS NEEDED 100 tablet 1    clobetasoL (TEMOVATE) 0.05 % external solution Apply to scalp 1-2x/day 50 mL 11    estradioL (ESTRACE) 2 MG tablet Take 1 tablet (2 mg total) by mouth once daily. 90 tablet 0    hydrOXYchloroQUINE (PLAQUENIL) 200 mg tablet 1 po daily x 2 wks then 1 po bid 60 tablet 6    ketoconazole (NIZORAL) 2 % shampoo Wash all scaling areas let sit 3 minutes then rinse 3x/wk 120 mL 11    levomefolate calcium (DEPLIN ORAL) Take by mouth.      minoxidiL (LONITEN) 2.5 MG tablet 1/4 po daily x 1 wk then 1/2 po daily x 1wk then 3/4 po daily x 1 wk then 1 po daily 90 tablet 3    multivitamin capsule Take 1 capsule by mouth once daily.      tretinoin (RETIN-A) 0.025 % cream Apply pea sized amt to face every other night x 2 wks then nightly 20 g 11    progesterone (PROMETRIUM) 200 MG capsule Take 1 capsule (200 mg total) by mouth nightly. 90 capsule 0    diltiaZEM (CARDIZEM) 30 MG tablet Take 1 tablet by mouth once daily.      nitrofurantoin, macrocrystal-monohydrate, (MACROBID) 100 MG capsule Take 100 mg by mouth 2 (two) times daily.      terbinafine HCL (LAMISIL) 250 mg  "tablet Take 250 mg by mouth.      tolterodine (DETROL LA) 4 MG 24 hr capsule Take 4 mg by mouth.       No facility-administered medications prior to visit.       Review of patient's allergies indicates:   Allergen Reactions    Penicillins Nausea And Vomiting    Quinolones Anxiety, Diarrhea, Nausea And Vomiting and Other (See Comments)       Family History   Problem Relation Age of Onset    Breast cancer Paternal Aunt     Ovarian cancer Neg Hx        Social History     Socioeconomic History    Marital status:     Number of children: 2   Occupational History    Occupation: radiologist     Employer: [employer]   Tobacco Use    Smoking status: Former    Smokeless tobacco: Never   Substance and Sexual Activity    Alcohol use: Yes    Drug use: Never    Sexual activity: Yes     Social Determinants of Health     Stress: Stress Concern Present (2020)    [employer] - Occupational Stress Questionnaire     Feeling of Stress : To some extent       OB History    Para Term  AB Living   3 2 2   1     SAB IAB Ectopic Multiple Live Births       1   2      # Outcome Date GA Lbr Danyel/2nd Weight Sex Delivery Anes PTL Lv   3 Ectopic            2 Term      CS-LTranv      1 Term      CS-LTranv          Review of Symptoms:  GENERAL: Denies weight gain or weight loss. Feeling well overall.   SKIN: Denies rash or lesions.   HEAD: Denies head injury or headache.   NODES: Denies enlarged lymph nodes.   CHEST: Denies chest pain or shortness of breath.   CARDIOVASCULAR: Denies palpitations or left sided chest pain.   ABDOMEN: No abdominal pain, constipation, diarrhea, nausea, vomiting or rectal bleeding.   URINARY: No frequency, dysuria, hematuria, or burning on urination.  HEMATOLOGIC: No easy bruisability or excessive bleeding.   MUSCULOSKELETAL: Denies joint pain or swelling.     /72   Ht 5' 2" (1.575 m)   Wt 73.4 kg (161 lb 13.1 oz)   LMP 2020 (Approximate)   Physical " Exam:  APPEARANCE: Well nourished, well developed, in no acute distress.  SKIN: Normal skin turgor, no lesions.  NECK: Neck symmetric without masses   RESPIRATORY: Normal respiratory effort with no retractions or use of accessory muscles  CARDIOVASCULAR: Peripheral vascular system with no swelling no varicosities and palpation of pulses normal  LYMPHATIC: No enlargements of the lymph nodes noted in the neck, axillae, or groin  ABDOMEN: Soft. No tenderness or masses. No hepatosplenomegaly. No hernias.  BREASTS: Symmetrical, no skin changes or visible lesions. No palpable masses, nipple discharge or adenopathy bilaterally.  PELVIC: Normal external female genitalia without lesions. Normal hair distribution. Adequate perineal body, normal urethral meatus. Urethra with no masses.  Bladder nontender. Vagina moist and well rugated without lesions or discharge. Cervix pink and without lesions. No significant cystocele or rectocele. Bimanual exam showed uterus normal size, shape, position, mobile and nontender. Adnexa without masses or tenderness. Urethra and bladder normal.   EXTREMITIES: No clubbing cyanosis or edema.    ENDOMETRIAL BIOPSY:    Female patient presents for an endometrial biopsy due to abnormal bleeding.      UPT is negative.    PRE ENDOMETRIAL BIOPSY COUNSELING:  The patient was informed of the risk of bleeding, infection, uterine perforation and pain and that the test will rule-out endometrial cancer with accuracy greater than 95%. She was counseled on the alternatives to endometrial biopsy and agrees to proceed.    PROCEDURE:  TIME OUT PERFORMED.  The cervix was visualized with a speculum.  A single tooth tenaculum was placed on the anterior lip prior to the biopsy.  A sterile endometrial pipelle was passed without difficulty to a depth of 8 cm.  Adequate endometrial tissue was obtained.    The specimen was placed in formalyn and sent to Pathology for histology evaluation.  The patient tolerated the  procedure well.    ASSESSMENT:   Abnormal uterine bleeding  626.8.    POST ENDOMETRIAL BIOPSY COUNSELING:  Manage post biopsy cramping with NSAIDs or Tylenol.  Expect spotting or light bleeding for a few days.  Report bleeding heavier than a period, fever > 101.0 F, worsening pain or a foul smelling vaginal discharge.    Counseling lasted approximately 15 minutes and all her questions were answered.    FOLLOW-UP: Pending biopsy results.      ASSESSMENT/PLAN:  Gynecologic exam normal    Postmenopausal bleeding  Comments:  EMB today  reduce estradiol to 1 mg   USG reviewed  consider hysterectomy   ablation discussed    Fibroid    Hormone replacement therapy (HRT)  Comments:  taking unopposed estrogen for 3 years from Dr. Smith until 2021  Orders:  -     estradioL (ESTRACE) 1 MG tablet; Take 1 tablet (1 mg total) by mouth once daily.  Dispense: 90 tablet; Refill: 3  -     progesterone (PROMETRIUM) 200 MG capsule; Take 1 capsule (200 mg total) by mouth nightly.  Dispense: 90 capsule; Refill: 0    HPV (human papilloma virus) anogenital infection  -     Liquid-Based Pap Smear, Screening  -     HPV High Risk Genotypes, PCR    Encounter for screening mammogram for malignant neoplasm of breast  -     Mammo Digital Screening Bilat w/ Dain; Future; Expected date: 03/14/2024          Patient was counseled today on Pelvic exams and Pap Smear guidelines.   We discussed STD screening if at high risk for a STD.  We discussed recommendation for breast cancer screening with mammogram every other year after the age of 40 and annually after the age of 50.    We discussed colon cancer screening when indicated.   Osteoporosis screening discussed when indicated.   She was advised to see her primary care physician for all other health maintenance.     FOLLOW-UP with me for next routine visit.

## 2024-03-18 LAB
FINAL PATHOLOGIC DIAGNOSIS: NORMAL
GROSS: NORMAL
Lab: NORMAL

## 2024-03-20 LAB
HPV HR 12 DNA SPEC QL NAA+PROBE: NEGATIVE
HPV16 AG SPEC QL: NEGATIVE
HPV18 DNA SPEC QL NAA+PROBE: NEGATIVE

## 2024-03-26 LAB
FINAL PATHOLOGIC DIAGNOSIS: ABNORMAL
Lab: ABNORMAL

## 2024-07-16 ENCOUNTER — HOSPITAL ENCOUNTER (OUTPATIENT)
Dept: RADIOLOGY | Facility: HOSPITAL | Age: 59
Discharge: HOME | End: 2024-07-16
Attending: OBSTETRICS & GYNECOLOGY
Payer: COMMERCIAL

## 2024-07-16 DIAGNOSIS — Z12.31 ENCOUNTER FOR SCREENING MAMMOGRAM FOR MALIGNANT NEOPLASM OF BREAST: ICD-10-CM

## 2024-07-16 PROCEDURE — 77067 SCR MAMMO BI INCL CAD: CPT | Mod: 26,,, | Performed by: RADIOLOGY

## 2024-07-16 PROCEDURE — 77063 BREAST TOMOSYNTHESIS BI: CPT | Mod: TC

## 2024-07-16 PROCEDURE — 77063 BREAST TOMOSYNTHESIS BI: CPT | Mod: 26,,, | Performed by: RADIOLOGY

## 2024-08-21 DIAGNOSIS — Z79.890 HORMONE REPLACEMENT THERAPY (HRT): ICD-10-CM

## 2024-08-21 RX ORDER — PROGESTERONE 200 MG/1
200 CAPSULE ORAL NIGHTLY
Qty: 90 CAPSULE | Refills: 1 | Status: SHIPPED | OUTPATIENT
Start: 2024-08-21

## 2024-08-21 NOTE — TELEPHONE ENCOUNTER
Refill Decision Note   Kirsten Kwongtri  is requesting a refill authorization.  Brief Assessment and Rationale for Refill:  Approve     Medication Therapy Plan:         Comments:     Note composed:3:34 PM 08/21/2024

## 2024-11-14 ENCOUNTER — CLINICAL SUPPORT (OUTPATIENT)
Dept: INTERNAL MEDICINE | Facility: CLINIC | Age: 59
End: 2024-11-14
Payer: COMMERCIAL

## 2024-11-14 DIAGNOSIS — Z00.00 ANNUAL PHYSICAL EXAM: Primary | ICD-10-CM

## 2024-11-14 LAB
25(OH)D3+25(OH)D2 SERPL-MCNC: 31 NG/ML (ref 30–96)
ALBUMIN SERPL BCP-MCNC: 3.8 G/DL (ref 3.5–5.2)
ALP SERPL-CCNC: 42 U/L (ref 40–150)
ALT SERPL W/O P-5'-P-CCNC: 14 U/L (ref 10–44)
ANION GAP SERPL CALC-SCNC: 10 MMOL/L (ref 8–16)
AST SERPL-CCNC: 16 U/L (ref 10–40)
BILIRUB SERPL-MCNC: 0.2 MG/DL (ref 0.1–1)
BUN SERPL-MCNC: 10 MG/DL (ref 6–20)
CALCIUM SERPL-MCNC: 9.4 MG/DL (ref 8.7–10.5)
CHLORIDE SERPL-SCNC: 109 MMOL/L (ref 95–110)
CHOLEST SERPL-MCNC: 232 MG/DL (ref 120–199)
CHOLEST/HDLC SERPL: 2.5 {RATIO} (ref 2–5)
CO2 SERPL-SCNC: 23 MMOL/L (ref 23–29)
CREAT SERPL-MCNC: 0.7 MG/DL (ref 0.5–1.4)
ERYTHROCYTE [DISTWIDTH] IN BLOOD BY AUTOMATED COUNT: 12.1 % (ref 11.5–14.5)
EST. GFR  (NO RACE VARIABLE): >60 ML/MIN/1.73 M^2
ESTIMATED AVG GLUCOSE: 94 MG/DL (ref 68–131)
GLUCOSE SERPL-MCNC: 83 MG/DL (ref 70–110)
HBA1C MFR BLD: 4.9 % (ref 4–5.6)
HCT VFR BLD AUTO: 35.8 % (ref 37–48.5)
HDLC SERPL-MCNC: 94 MG/DL (ref 40–75)
HDLC SERPL: 40.5 % (ref 20–50)
HGB BLD-MCNC: 12.1 G/DL (ref 12–16)
LDLC SERPL CALC-MCNC: 82 MG/DL (ref 63–159)
MCH RBC QN AUTO: 32.2 PG (ref 27–31)
MCHC RBC AUTO-ENTMCNC: 33.8 G/DL (ref 32–36)
MCV RBC AUTO: 95 FL (ref 82–98)
NONHDLC SERPL-MCNC: 138 MG/DL
PLATELET # BLD AUTO: 313 K/UL (ref 150–450)
PMV BLD AUTO: 9.2 FL (ref 9.2–12.9)
POTASSIUM SERPL-SCNC: 4 MMOL/L (ref 3.5–5.1)
PROT SERPL-MCNC: 6.7 G/DL (ref 6–8.4)
RBC # BLD AUTO: 3.76 M/UL (ref 4–5.4)
SODIUM SERPL-SCNC: 142 MMOL/L (ref 136–145)
T4 FREE SERPL-MCNC: 0.76 NG/DL (ref 0.71–1.51)
TRIGL SERPL-MCNC: 280 MG/DL (ref 30–150)
TSH SERPL DL<=0.005 MIU/L-ACNC: 4.29 UIU/ML (ref 0.4–4)
WBC # BLD AUTO: 4.92 K/UL (ref 3.9–12.7)

## 2024-11-14 PROCEDURE — 80053 COMPREHEN METABOLIC PANEL: CPT | Mod: PO | Performed by: FAMILY MEDICINE

## 2024-11-14 PROCEDURE — 80061 LIPID PANEL: CPT | Performed by: FAMILY MEDICINE

## 2024-11-14 PROCEDURE — 82172 ASSAY OF APOLIPOPROTEIN: CPT | Performed by: FAMILY MEDICINE

## 2024-11-14 PROCEDURE — 85027 COMPLETE CBC AUTOMATED: CPT | Mod: PO | Performed by: FAMILY MEDICINE

## 2024-11-14 PROCEDURE — 83036 HEMOGLOBIN GLYCOSYLATED A1C: CPT | Performed by: FAMILY MEDICINE

## 2024-11-14 PROCEDURE — 84439 ASSAY OF FREE THYROXINE: CPT | Performed by: FAMILY MEDICINE

## 2024-11-14 PROCEDURE — 84443 ASSAY THYROID STIM HORMONE: CPT | Performed by: FAMILY MEDICINE

## 2024-11-14 PROCEDURE — 82306 VITAMIN D 25 HYDROXY: CPT | Performed by: FAMILY MEDICINE

## 2024-11-16 LAB — APO B SERPL-MCNC: 87 MG/DL

## 2024-11-18 ENCOUNTER — OFFICE VISIT (OUTPATIENT)
Dept: FAMILY MEDICINE | Facility: CLINIC | Age: 59
End: 2024-11-18
Payer: COMMERCIAL

## 2024-11-18 ENCOUNTER — LAB VISIT (OUTPATIENT)
Dept: LAB | Facility: HOSPITAL | Age: 59
End: 2024-11-18
Attending: FAMILY MEDICINE
Payer: COMMERCIAL

## 2024-11-18 VITALS
BODY MASS INDEX: 31.08 KG/M2 | WEIGHT: 168.88 LBS | SYSTOLIC BLOOD PRESSURE: 132 MMHG | DIASTOLIC BLOOD PRESSURE: 72 MMHG | OXYGEN SATURATION: 96 % | HEIGHT: 62 IN | HEART RATE: 83 BPM

## 2024-11-18 DIAGNOSIS — E66.811 CLASS 1 OBESITY WITH BODY MASS INDEX (BMI) OF 30.0 TO 30.9 IN ADULT, UNSPECIFIED OBESITY TYPE, UNSPECIFIED WHETHER SERIOUS COMORBIDITY PRESENT: ICD-10-CM

## 2024-11-18 DIAGNOSIS — R79.89 HIGH SERUM THYROID STIMULATING HORMONE (TSH): ICD-10-CM

## 2024-11-18 DIAGNOSIS — Z23 NEED FOR INFLUENZA VACCINATION: Primary | ICD-10-CM

## 2024-11-18 LAB — THYROPEROXIDASE IGG SERPL-ACNC: <6 IU/ML

## 2024-11-18 PROCEDURE — 99396 PREV VISIT EST AGE 40-64: CPT | Mod: S$GLB,,, | Performed by: FAMILY MEDICINE

## 2024-11-18 PROCEDURE — 90471 IMMUNIZATION ADMIN: CPT | Mod: S$GLB,,, | Performed by: FAMILY MEDICINE

## 2024-11-18 PROCEDURE — 3008F BODY MASS INDEX DOCD: CPT | Mod: CPTII,S$GLB,, | Performed by: FAMILY MEDICINE

## 2024-11-18 PROCEDURE — 36415 COLL VENOUS BLD VENIPUNCTURE: CPT | Mod: PO | Performed by: FAMILY MEDICINE

## 2024-11-18 PROCEDURE — 4010F ACE/ARB THERAPY RXD/TAKEN: CPT | Mod: CPTII,S$GLB,, | Performed by: FAMILY MEDICINE

## 2024-11-18 PROCEDURE — 3075F SYST BP GE 130 - 139MM HG: CPT | Mod: CPTII,S$GLB,, | Performed by: FAMILY MEDICINE

## 2024-11-18 PROCEDURE — 90656 IIV3 VACC NO PRSV 0.5 ML IM: CPT | Mod: S$GLB,,, | Performed by: FAMILY MEDICINE

## 2024-11-18 PROCEDURE — 3078F DIAST BP <80 MM HG: CPT | Mod: CPTII,S$GLB,, | Performed by: FAMILY MEDICINE

## 2024-11-18 PROCEDURE — 1160F RVW MEDS BY RX/DR IN RCRD: CPT | Mod: CPTII,S$GLB,, | Performed by: FAMILY MEDICINE

## 2024-11-18 PROCEDURE — 3044F HG A1C LEVEL LT 7.0%: CPT | Mod: CPTII,S$GLB,, | Performed by: FAMILY MEDICINE

## 2024-11-18 PROCEDURE — 99999 PR PBB SHADOW E&M-EST. PATIENT-LVL IV: CPT | Mod: PBBFAC,,, | Performed by: FAMILY MEDICINE

## 2024-11-18 PROCEDURE — 1159F MED LIST DOCD IN RCRD: CPT | Mod: CPTII,S$GLB,, | Performed by: FAMILY MEDICINE

## 2024-11-18 PROCEDURE — 86376 MICROSOMAL ANTIBODY EACH: CPT | Performed by: FAMILY MEDICINE

## 2024-11-18 NOTE — PROGRESS NOTES
2024                                                                                                                                                                                                                                                                                      Kirsten Doshi  [address] [place] [address]                                                                                                                                                                                                                                                                                                RE: Kirsten Doshi                                                        Clinic #:[id number]                                                                                                                                   Dear  Kirsten Doshi,                                                                                                                                           Thank you for allowing me to serve you and perform your Executive Health exam on 2024.   This letter will serve a brief summary of the history, physical findings, and laboratory/studies performed and recommendations at that time.                                                                                         REASON FOR VISIT: Executive Health Preventive Physical Examination    Past Medical History:   Diagnosis Date    Colon polyps     Endometriosis     PVC (premature ventricular contraction)     Serotonin syndrome     anxiety       Past Surgical History:   Procedure Laterality Date    BTL       SECTION      X2    left marsupilization      right salpingectomy      ectopic       Family History   Problem Relation Name Age of Onset    Breast cancer Paternal Aunt      Ovarian cancer Neg Hx         Social History     Socioeconomic History    Marital status:     Number of children: 2    Occupational History    Occupation: radiologist     Employer: [employer]   Tobacco Use    Smoking status: Former    Smokeless tobacco: Never   Substance and Sexual Activity    Alcohol use: Yes    Drug use: Never    Sexual activity: Yes     Social Drivers of Health     Financial Resource Strain: Low Risk  (11/13/2024)    Overall Financial Resource Strain (CARDIA)     Difficulty of Paying Living Expenses: Not hard at all   Food Insecurity: No Food Insecurity (11/13/2024)    Hunger Vital Sign     Worried About Running Out of Food in the Last Year: Never true     Ran Out of Food in the Last Year: Never true   Transportation Needs: Not on File (8/21/2024)    Received from [facility]    Transportation Needs     Transportation: 0   Physical Activity: Insufficiently Active (11/13/2024)    Exercise Vital Sign     Days of Exercise per Week: 1 day     Minutes of Exercise per Session: 20 min   Stress: Stress Concern Present (11/13/2024)    [employer] [place] [employer] - Occupational Stress Questionnaire     Feeling of Stress : To some extent   Housing Stability: Unknown (11/13/2024)    Housing Stability Vital Sign     Unable to Pay for Housing in the Last Year: No       Allergies: Penicillins and Quinolones    Current Outpatient Medications   Medication Sig Dispense Refill    acetylcysteine 600 mg Tab Take by mouth.      ALPRAZolam (XANAX) 0.25 MG tablet TAKE ONE TABLET BY MOUTH THREE TIMES A DAY AS NEEDED 100 tablet 1    clobetasoL (TEMOVATE) 0.05 % external solution Apply to scalp 1-2x/day 50 mL 11    estradioL (ESTRACE) 1 MG tablet Take 1 tablet (1 mg total) by mouth once daily. 90 tablet 3    estradioL (ESTRACE) 2 MG tablet Take 1 tablet (2 mg total) by mouth once daily. 90 tablet 0    hydroxychloroquine (PLAQUENIL) 200 mg tablet TAKE 1 TABLET BY MOUTH DAILY FOR 2 WEEKS THEN 1 TABLET BY MOUTH TWO TIMES A DAY 60 tablet 2    ketoconazole (NIZORAL) 2 % shampoo Wash all scaling areas let sit 3 minutes then rinse 3x/wk 120  "mL 11    levomefolate calcium (DEPLIN ORAL) Take by mouth.      minoxidiL (LONITEN) 2.5 MG tablet 1/4 po daily x 1 wk then 1/2 po daily x 1wk then 3/4 po daily x 1 wk then 1 po daily 90 tablet 3    progesterone (PROMETRIUM) 200 MG capsule TAKE 1 CAPSULE (200 MG TOTAL) BY MOUTH NIGHTLY. 90 capsule 1    tolterodine (DETROL LA) 4 MG 24 hr capsule Take 4 mg by mouth.      tretinoin (RETIN-A) 0.025 % cream Apply pea sized amt to face every other night x 2 wks then nightly 20 g 11     Current Facility-Administered Medications   Medication Dose Route Frequency Provider Last Rate Last Admin    influenza (Flulaval, Fluzone, Fluarix) 45 mcg/0.5 mL IM vaccine (> or = 6 mo) 0.5 mL  0.5 mL Intramuscular 1 time in Clinic/[facility] TAPAN Hutchison MD           REVIEW OF SYSTEMS:  No recent changes in weight, or complaints of fatigue. No recent changes in vision, or hearing. Denies frequent headaches.No recent changes in voice. No new or changing skin lesions. Denies abnormal bruising or bleeding.  Denies chest pain or sensation of skipped beats. No new onset of shortness of breath, or dyspnea on exertion. Denies abdominal discomfort, constipation, diarrhea,or blood in stool. Denies difficulty with urination.   No recent joint swelling or muscle discomfort. Denies pain or weakness in extremeties. No recent loss of balance. Denies problems with sleep or depression.        Remainder of the review of systems without pertinent positves at this time.                                                                              PHYSICAL EXAM:   VITAL SIGNS: /72   Pulse 83   Ht 5' 2" (1.575 m)   Wt 76.6 kg (168 lb 14 oz)   LMP 06/22/2020 (Approximate)   SpO2 96%   BMI 30.89 kg/m²   GENERAL APPEARANCE:  Well nourished and normally developed,  pleasant 58 y.o. female, in good spirits.  SKIN: Without rashes or overt lesions.  HEENT: Head normacephalic. There was no scleral icterus. Mucous membranes were moist. Dentition. Neck is " supple, no thyromegally, or carotid bruits.  LUNGS: Clear to auscultation bilaterally. Normal respiratory effort.  HEART: Exam reveals regular rate and rhythm. First and second heart sounds normal. No murmurs, rubs or gallops.   ABDOMEN: Soft, non-tender, non-distended. Exam reveals normal bowl sounds, no masses, no organomegaly and no aortic enlargement.    EXTREMITIES:  Nonedematous, both femoral and pedal pulses are normal. No joint stiffness or tenderness. Full range of motion and strength, upper and lower bilaterally.      ASSESSMENT/RECOMMENDATIONS :  Wellness exam    At this time,  you appear to be in good medical condition.    Continue to work on regular exercise, maintenance of a healthy weight, balanced diet rich in fruits/vegetables and lean protein, and avoidance of unhealthy habits like smoking and excessive alcohol intake.  I look forward to seeing you again next year.    Please contact me should you have any questions or concerns regarding physical findings, or my recommendations.       Sincerely yours,        TAPAN Hutchison M.D.  Department of Family Practice  [facility]

## 2024-11-22 DIAGNOSIS — F41.9 ANXIETY: Primary | ICD-10-CM

## 2024-11-22 RX ORDER — ALPRAZOLAM 0.25 MG/1
0.25 TABLET ORAL 3 TIMES DAILY PRN
Qty: 100 TABLET | Refills: 1 | Status: SHIPPED | OUTPATIENT
Start: 2024-11-22

## 2024-11-22 NOTE — TELEPHONE ENCOUNTER
No care due was identified.  [facility] Embedded Care Due Messages. Reference number: [id number].   11/22/2024 3:41:45 AM CST

## 2024-12-20 ENCOUNTER — TELEPHONE (OUTPATIENT)
Dept: BARIATRICS | Facility: CLINIC | Age: 59
End: 2024-12-20
Payer: COMMERCIAL

## 2025-04-01 ENCOUNTER — OFFICE VISIT (OUTPATIENT)
Dept: OBSTETRICS AND GYNECOLOGY | Facility: CLINIC | Age: 60
End: 2025-04-01
Payer: COMMERCIAL

## 2025-04-01 VITALS
HEIGHT: 62 IN | WEIGHT: 166 LBS | BODY MASS INDEX: 30.55 KG/M2 | SYSTOLIC BLOOD PRESSURE: 116 MMHG | DIASTOLIC BLOOD PRESSURE: 68 MMHG

## 2025-04-01 DIAGNOSIS — N95.0 POSTMENOPAUSAL BLEEDING: ICD-10-CM

## 2025-04-01 DIAGNOSIS — Z01.419 ROUTINE GYNECOLOGICAL EXAMINATION: Primary | ICD-10-CM

## 2025-04-01 DIAGNOSIS — N95.2 VAGINAL ATROPHY: ICD-10-CM

## 2025-04-01 DIAGNOSIS — Z78.0 MENOPAUSE: ICD-10-CM

## 2025-04-01 DIAGNOSIS — D21.9 FIBROID: ICD-10-CM

## 2025-04-01 DIAGNOSIS — R93.89 THICKENED ENDOMETRIUM: ICD-10-CM

## 2025-04-01 DIAGNOSIS — R87.610 ASCUS OF CERVIX WITH NEGATIVE HIGH RISK HPV: ICD-10-CM

## 2025-04-01 DIAGNOSIS — Z79.890 HORMONE REPLACEMENT THERAPY (HRT): ICD-10-CM

## 2025-04-01 PROCEDURE — 99999 PR PBB SHADOW E&M-EST. PATIENT-LVL III: CPT | Mod: PBBFAC,,, | Performed by: OBSTETRICS & GYNECOLOGY

## 2025-04-01 PROCEDURE — 3078F DIAST BP <80 MM HG: CPT | Mod: CPTII,S$GLB,, | Performed by: OBSTETRICS & GYNECOLOGY

## 2025-04-01 PROCEDURE — 3008F BODY MASS INDEX DOCD: CPT | Mod: CPTII,S$GLB,, | Performed by: OBSTETRICS & GYNECOLOGY

## 2025-04-01 PROCEDURE — 3074F SYST BP LT 130 MM HG: CPT | Mod: CPTII,S$GLB,, | Performed by: OBSTETRICS & GYNECOLOGY

## 2025-04-01 PROCEDURE — 87624 HPV HI-RISK TYP POOLED RSLT: CPT | Performed by: OBSTETRICS & GYNECOLOGY

## 2025-04-01 PROCEDURE — 1159F MED LIST DOCD IN RCRD: CPT | Mod: CPTII,S$GLB,, | Performed by: OBSTETRICS & GYNECOLOGY

## 2025-04-01 PROCEDURE — 99396 PREV VISIT EST AGE 40-64: CPT | Mod: S$GLB,,, | Performed by: OBSTETRICS & GYNECOLOGY

## 2025-04-01 RX ORDER — PROGESTERONE 200 MG/1
200 CAPSULE ORAL NIGHTLY
Qty: 90 CAPSULE | Refills: 3 | Status: SHIPPED | OUTPATIENT
Start: 2025-04-01

## 2025-04-01 RX ORDER — ESTRADIOL 10 UG/1
1 TABLET, FILM COATED VAGINAL
Qty: 24 TABLET | Refills: 3 | Status: SHIPPED | OUTPATIENT
Start: 2025-04-03 | End: 2026-04-03

## 2025-04-01 RX ORDER — ESTRADIOL 1 MG/1
1 TABLET ORAL DAILY
Qty: 90 TABLET | Refills: 3 | Status: SHIPPED | OUTPATIENT
Start: 2025-04-01 | End: 2026-04-01

## 2025-04-01 RX ORDER — ESTRADIOL 10 UG/1
1 TABLET, FILM COATED VAGINAL
Qty: 8 TABLET | Refills: 11 | Status: SHIPPED | OUTPATIENT
Start: 2025-04-03 | End: 2025-04-01 | Stop reason: SDUPTHER

## 2025-04-01 NOTE — PROGRESS NOTES
Chief Complaint   Patient presents with    Well Woman       History of Present Illness: Kirsten Doshi is a 59 y.o. female that presents today 2025 with Patient's last menstrual period was 2020 (approximate).  for well gyn visit.    Past Medical History:   Diagnosis Date    Colon polyps     Endometriosis     PVC (premature ventricular contraction)     Serotonin syndrome     anxiety       Past Surgical History:   Procedure Laterality Date    BTL       SECTION      X2    left marsupilization      right salpingectomy      ectopic       Medications Prior to Visit[1]    Review of patient's allergies indicates:   Allergen Reactions    Penicillins Nausea And Vomiting    Quinolones Anxiety, Diarrhea, Nausea And Vomiting and Other (See Comments)       Family History   Problem Relation Name Age of Onset    Breast cancer Paternal Aunt      Ovarian cancer Neg Hx         Social History     Tobacco Use    Smoking status: Former    Smokeless tobacco: Never   Substance Use Topics    Alcohol use: Yes       Social History     Substance and Sexual Activity   Sexual Activity Yes       OB History    Para Term  AB Living   3 2 2  1    SAB IAB Ectopic Multiple Live Births     1  2      # Outcome Date GA Lbr Danyel/2nd Weight Sex Type Anes PTL Lv   3 Ectopic            2 Term      CS-LTranv      1 Term      CS-LTranv          Review of Symptoms:  GENERAL: Denies weight gain or weight loss. Feeling well overall.   SKIN: Denies rash or lesions.   HEAD: Denies head injury or headache.   NODES: Denies enlarged lymph nodes.   CHEST: Denies chest pain or shortness of breath.   CARDIOVASCULAR: Denies palpitations or left sided chest pain.   ABDOMEN: No abdominal pain, constipation, diarrhea, nausea, vomiting or rectal bleeding.   URINARY: No frequency, dysuria, hematuria, or burning on urination.  HEMATOLOGIC: No easy bruisability or excessive bleeding.   MUSCULOSKELETAL: Denies joint pain or swelling.     BP  "116/68   Ht 5' 2" (1.575 m)   Wt 75.3 kg (166 lb 0.1 oz)   LMP 06/22/2020 (Approximate)   Body mass index is 30.36 kg/m².      Physical Exam:  APPEARANCE: Well nourished, well developed, in no acute distress.  SKIN: Normal skin turgor, no lesions.  NECK: Neck symmetric without masses   RESPIRATORY: Normal respiratory effort with no retractions or use of accessory muscles  CARDIOVASCULAR: Peripheral vascular system with no swelling no varicosities and palpation of pulses normal  LYMPHATIC: No enlargements of the lymph nodes noted in the neck, axillae, or groin  ABDOMEN: Soft. No tenderness or masses. No hepatosplenomegaly. No hernias.  BREASTS: Symmetrical, no skin changes or visible lesions. No palpable masses, nipple discharge or adenopathy bilaterally.  PELVIC: Normal external female genitalia without lesions. Normal hair distribution. Adequate perineal body, normal urethral meatus. Urethra with no masses.  Bladder nontender. Vagina moist and well rugated without lesions or discharge. Cervix pink and without lesions. No significant cystocele or rectocele. Bimanual exam showed uterus normal size, shape, position, mobile and nontender. Adnexa without masses or tenderness. Urethra and bladder normal.   EXTREMITIES: No clubbing cyanosis or edema.    ASSESSMENT/PLAN:  Routine gynecological examination    ASCUS of cervix with negative high risk HPV  -     Liquid-Based Pap Smear, Screening    Thickened endometrium  -     US Pelvis Comp with Transvag NON-OB (xpd; Future; Expected date: 04/01/2025    Postmenopausal bleeding  Comments:  3 episodes in 24-25  Orders:  -     US Pelvis Comp with Transvag NON-OB (xpd; Future; Expected date: 04/01/2025    Hormone replacement therapy (HRT)  Comments:  taking unopposed estrogen for 3 years from Dr. Smith until 2021  Orders:  -     estradioL (ESTRACE) 1 MG tablet; Take 1 tablet (1 mg total) by mouth once daily.  Dispense: 90 tablet; Refill: 3  -     progesterone (PROMETRIUM) 200 " MG capsule; Take 1 capsule (200 mg total) by mouth nightly.  Dispense: 90 capsule; Refill: 3    Fibroid  -     US Pelvis Comp with Transvag NON-OB (xpd; Future; Expected date: 04/01/2025    Menopause  -     US Pelvis Comp with Transvag NON-OB (xpd; Future; Expected date: 04/01/2025  -     DXA Bone Density Axial Skeleton 1 or more sites; Future; Expected date: 04/01/2025    Vaginal atrophy  -     estradioL (VAGIFEM) 10 mcg Tab; Place 1 tablet (10 mcg total) vaginally twice a week.  Dispense: 24 tablet; Refill: 3    Other orders  -     Discontinue: estradioL (VAGIFEM) 10 mcg Tab; Place 1 tablet (10 mcg total) vaginally twice a week.  Dispense: 8 tablet; Refill: 11          Patient was counseled today on Pelvic exams and Pap Smear guidelines.   We discussed STD screening if at high risk for a STD.  We discussed recommendation for breast cancer screening with mammogram every other year after the age of 40 and annually after the age of 50.    We discussed colon cancer screening when indicated.   Osteoporosis screening discussed when indicated.   She was advised to see her primary care physician for all other health maintenance.     FOLLOW-UP with me for next routine visit.          [1]   Outpatient Medications Prior to Visit   Medication Sig Dispense Refill    acetylcysteine 600 mg Tab Take by mouth.      ALPRAZolam (XANAX) 0.25 MG tablet Take 1 tablet (0.25 mg total) by mouth 3 (three) times daily as needed for Anxiety. 100 tablet 1    clobetasoL (TEMOVATE) 0.05 % external solution Apply to scalp 1-2x/day 50 mL 11    efinaconazole (JUBLIA) 10 % Christian Apply once daily to affected nail 4 mL 1    hydroxychloroquine (PLAQUENIL) 200 mg tablet TAKE 1 TABLET BY MOUTH DAILY FOR 2 WEEKS THEN 1 TABLET BY MOUTH TWO TIMES A DAY 60 tablet 2    ketoconazole (NIZORAL) 2 % shampoo Wash all scaling areas let sit 3 minutes then rinse 3x/wk 120 mL 11    levomefolate calcium (DEPLIN ORAL) Take by mouth.      minoxidiL (LONITEN) 2.5 MG  tablet 1/4 po daily x 1 wk then 1/2 po daily x 1wk then 3/4 po daily x 1 wk then 1 po daily 90 tablet 3    tretinoin (RETIN-A) 0.025 % cream Apply pea sized amt to face every other night x 2 wks then nightly 20 g 11    estradioL (ESTRACE) 2 MG tablet Take 1 tablet (2 mg total) by mouth once daily. 90 tablet 0    progesterone (PROMETRIUM) 200 MG capsule TAKE 1 CAPSULE (200 MG TOTAL) BY MOUTH NIGHTLY. 90 capsule 1    tolterodine (DETROL LA) 4 MG 24 hr capsule Take 4 mg by mouth.      estradioL (ESTRACE) 1 MG tablet Take 1 tablet (1 mg total) by mouth once daily. 90 tablet 3     No facility-administered medications prior to visit.

## 2025-04-15 ENCOUNTER — RESULTS FOLLOW-UP (OUTPATIENT)
Dept: OBSTETRICS AND GYNECOLOGY | Facility: CLINIC | Age: 60
End: 2025-04-15

## 2025-04-16 ENCOUNTER — HOSPITAL ENCOUNTER (OUTPATIENT)
Dept: RADIOLOGY | Facility: HOSPITAL | Age: 60
Discharge: HOME | End: 2025-04-16
Attending: OBSTETRICS & GYNECOLOGY
Payer: COMMERCIAL

## 2025-04-16 DIAGNOSIS — N95.0 POSTMENOPAUSAL BLEEDING: ICD-10-CM

## 2025-04-16 DIAGNOSIS — R93.89 THICKENED ENDOMETRIUM: ICD-10-CM

## 2025-04-16 DIAGNOSIS — Z78.0 MENOPAUSE: ICD-10-CM

## 2025-04-16 DIAGNOSIS — D21.9 FIBROID: ICD-10-CM

## 2025-04-16 PROCEDURE — 77091 TBS TECHL CALCULATION ONLY: CPT | Mod: PO

## 2025-04-16 PROCEDURE — 76830 TRANSVAGINAL US NON-OB: CPT | Mod: 26,,, | Performed by: RADIOLOGY

## 2025-04-16 PROCEDURE — 76856 US EXAM PELVIC COMPLETE: CPT | Mod: TC,PN

## 2025-04-16 PROCEDURE — 77080 DXA BONE DENSITY AXIAL: CPT | Mod: 26,,, | Performed by: STUDENT IN AN ORGANIZED HEALTH CARE EDUCATION/TRAINING PROGRAM

## 2025-04-16 PROCEDURE — 76856 US EXAM PELVIC COMPLETE: CPT | Mod: 26,,, | Performed by: RADIOLOGY

## 2025-04-16 PROCEDURE — 77092 TBS I&R FX RSK QHP: CPT | Mod: ,,, | Performed by: STUDENT IN AN ORGANIZED HEALTH CARE EDUCATION/TRAINING PROGRAM

## 2025-04-17 ENCOUNTER — RESULTS FOLLOW-UP (OUTPATIENT)
Dept: OBSTETRICS AND GYNECOLOGY | Facility: CLINIC | Age: 60
End: 2025-04-17

## 2025-04-28 ENCOUNTER — OFFICE VISIT (OUTPATIENT)
Dept: INTERNAL MEDICINE | Facility: CLINIC | Age: 60
End: 2025-04-28
Payer: COMMERCIAL

## 2025-04-28 PROCEDURE — 99499 UNLISTED E&M SERVICE: CPT | Mod: 95,,,

## 2025-04-28 NOTE — PROGRESS NOTES
Patient ID: Kirsten Doshi is a 59 y.o. White female    Subjective  Chief Complaint: patient presents for medical weight loss management.    Co-morbidities: none - Pt mention possible blood pressure issues and increase in TG. Pt to request PCP to add to problem list.     History of weight loss therapy: Pt denies previous weight management medication use.     Weight loss history:  Starting weight:    4/23/2025   Recent Readings    Weight (lbs) 157 lb    BMI 28.71 BMI      Objective  Lab Results   Component Value Date     11/14/2024     11/06/2023     11/01/2022     Lab Results   Component Value Date    K 4.0 11/14/2024    K 4.4 11/06/2023    K 4.4 11/01/2022     Lab Results   Component Value Date     11/14/2024     11/06/2023     11/01/2022     Lab Results   Component Value Date    CO2 23 11/14/2024    CO2 26 11/06/2023    CO2 21 (L) 11/01/2022     Lab Results   Component Value Date    BUN 10 11/14/2024    BUN 12 11/06/2023    BUN 11 11/01/2022     Lab Results   Component Value Date    GLU 83 11/14/2024    GLU 97 11/06/2023    GLU 87 11/01/2022     Lab Results   Component Value Date    CALCIUM 9.4 11/14/2024    CALCIUM 9.3 11/06/2023    CALCIUM 8.9 11/01/2022     Lab Results   Component Value Date    PROT 6.7 11/14/2024    PROT 6.5 11/06/2023    PROT 6.6 11/01/2022     Lab Results   Component Value Date    ALBUMIN 3.8 11/14/2024    ALBUMIN 3.8 11/06/2023    ALBUMIN 3.6 11/01/2022     Lab Results   Component Value Date    BILITOT 0.2 11/14/2024    BILITOT 0.3 11/06/2023    BILITOT 0.1 11/01/2022     Lab Results   Component Value Date    AST 16 11/14/2024    AST 17 11/06/2023    AST 24 11/01/2022     Lab Results   Component Value Date    ALT 14 11/14/2024    ALT 18 11/06/2023    ALT 16 11/01/2022     Lab Results   Component Value Date    ANIONGAP 10 11/14/2024    ANIONGAP 7 (L) 11/06/2023    ANIONGAP 8 11/01/2022     Lab Results   Component Value Date    CREATININE 0.7 11/14/2024     CREATININE 0.7 11/06/2023    CREATININE 0.7 11/01/2022     Lab Results   Component Value Date    EGFRNORACEVR >60 11/14/2024    EGFRNORACEVR >60 11/06/2023    EGFRNORACEVR >60.0 11/01/2022     Assessment/Plan  -Pt does not qualify for GLP-1 RA therapy  -Pt to request PCP to add elevated BP or TG to problem list and reschedule appt to reassess eligibility      Patient consented to pharmacist management via collaborative practice.

## 2025-07-29 DIAGNOSIS — Z12.31 ENCOUNTER FOR SCREENING MAMMOGRAM FOR MALIGNANT NEOPLASM OF BREAST: Primary | ICD-10-CM

## 2025-08-05 ENCOUNTER — HOSPITAL ENCOUNTER (OUTPATIENT)
Dept: RADIOLOGY | Facility: HOSPITAL | Age: 60
Discharge: HOME | End: 2025-08-05
Attending: FAMILY MEDICINE
Payer: COMMERCIAL

## 2025-08-05 DIAGNOSIS — Z12.31 ENCOUNTER FOR SCREENING MAMMOGRAM FOR MALIGNANT NEOPLASM OF BREAST: ICD-10-CM

## 2025-08-05 PROCEDURE — 77063 BREAST TOMOSYNTHESIS BI: CPT | Mod: 26,,, | Performed by: RADIOLOGY

## 2025-08-05 PROCEDURE — 77067 SCR MAMMO BI INCL CAD: CPT | Mod: 26,,, | Performed by: RADIOLOGY

## 2025-08-05 PROCEDURE — 77063 BREAST TOMOSYNTHESIS BI: CPT | Mod: TC
